# Patient Record
Sex: FEMALE | Race: WHITE | Employment: OTHER | URBAN - METROPOLITAN AREA
[De-identification: names, ages, dates, MRNs, and addresses within clinical notes are randomized per-mention and may not be internally consistent; named-entity substitution may affect disease eponyms.]

---

## 2018-10-24 PROBLEM — E78.5 HYPERLIPIDEMIA: Status: ACTIVE | Noted: 2018-10-24

## 2018-10-24 PROBLEM — L71.9 ROSACEA: Status: ACTIVE | Noted: 2018-10-24

## 2018-10-24 PROBLEM — R73.9 HYPERGLYCEMIA: Status: ACTIVE | Noted: 2018-10-24

## 2018-11-16 ENCOUNTER — HOSPITAL ENCOUNTER (OUTPATIENT)
Dept: CT IMAGING | Age: 68
Discharge: HOME | End: 2018-11-16
Attending: INTERNAL MEDICINE
Payer: SELF-PAY

## 2018-11-16 DIAGNOSIS — E78.5 HYPERLIPIDEMIA, UNSPECIFIED HYPERLIPIDEMIA TYPE: ICD-10-CM

## 2018-11-16 DIAGNOSIS — Z82.49 FH: CAD (CORONARY ARTERY DISEASE): ICD-10-CM

## 2018-11-16 PROCEDURE — 75571 CT HRT W/O DYE W/CA TEST: CPT

## 2018-11-26 NOTE — PROGRESS NOTES
Spoke with pt informed her that great news! Calcium score is 0. No detectable blockages in the coronary arteries.

## 2019-05-31 ENCOUNTER — HOSPITAL ENCOUNTER (OUTPATIENT)
Dept: DIABETES SERVICES | Age: 69
Discharge: HOME | End: 2019-05-31
Payer: MEDICARE

## 2019-05-31 DIAGNOSIS — E11.9 CONTROLLED TYPE 2 DIABETES MELLITUS WITHOUT COMPLICATION, WITHOUT LONG-TERM CURRENT USE OF INSULIN (HCC): ICD-10-CM

## 2019-05-31 PROCEDURE — G0108 DIAB MANAGE TRN  PER INDIV: HCPCS

## 2019-06-27 ENCOUNTER — HOSPITAL ENCOUNTER (OUTPATIENT)
Dept: MAMMOGRAPHY | Age: 69
Discharge: HOME | End: 2019-06-27
Attending: INTERNAL MEDICINE
Payer: MEDICARE

## 2019-06-27 DIAGNOSIS — Z78.0 POST-MENOPAUSAL: ICD-10-CM

## 2019-06-27 DIAGNOSIS — Z12.39 BREAST CANCER SCREENING: ICD-10-CM

## 2019-06-27 PROCEDURE — 77067 SCR MAMMO BI INCL CAD: CPT

## 2019-06-27 PROCEDURE — 77080 DXA BONE DENSITY AXIAL: CPT

## 2019-07-01 ENCOUNTER — HOSPITAL ENCOUNTER (OUTPATIENT)
Dept: DIABETES SERVICES | Age: 69
Discharge: HOME | End: 2019-07-01
Payer: MEDICARE

## 2019-07-01 PROCEDURE — G0109 DIAB MANAGE TRN IND/GROUP: HCPCS

## 2019-07-02 NOTE — PROGRESS NOTES
Spoke with pt informed her that her bone density still osteopenia but does not need to restart medication at this time.

## 2019-07-10 ENCOUNTER — HOSPITAL ENCOUNTER (OUTPATIENT)
Dept: LAB | Age: 69
Discharge: HOME | End: 2019-07-10

## 2019-07-10 PROCEDURE — 88305 TISSUE EXAM BY PATHOLOGIST: CPT

## 2019-07-22 ENCOUNTER — HOSPITAL ENCOUNTER (OUTPATIENT)
Dept: DIABETES SERVICES | Age: 69
Discharge: HOME | End: 2019-07-22
Payer: MEDICARE

## 2019-07-22 PROCEDURE — G0109 DIAB MANAGE TRN IND/GROUP: HCPCS

## 2019-07-22 NOTE — PROGRESS NOTES
Attended nutrition diabetes #2 group session today. Topics included: plate method for portion control; fiber and sodium guidelines; sugar substitutes; alcohol; eating out; recipe modification; label reading. Participant's nutrition goal:To lower A1C and prevent diabetes complications she will not skip meals and have a meal replacement and re-evaluate in 2 months. Participant's nutrition support plan: check food labels and use handouts given. Barriers identified by participant: Pt does not cook enough and needs to be better organized to have replacement bars available. Voiced/demonstrated understanding of material covered. Anticipated adherence is good. Plan for follow up is: will be sent a follow up questionnaire at 6 months and one year.

## 2019-08-06 ENCOUNTER — HOSPITAL ENCOUNTER (OUTPATIENT)
Dept: DIABETES SERVICES | Age: 69
Discharge: HOME | End: 2019-08-06
Payer: MEDICARE

## 2019-08-06 PROCEDURE — G0109 DIAB MANAGE TRN IND/GROUP: HCPCS

## 2019-08-06 NOTE — PROGRESS NOTES
Participant attended Diabetes #1 session today. Topics included: Characteristics/pathophysiology type 1/type 2 diabetes; Goal/acceptable blood glucose ranges/Hgb A1C/interpreting/using results;meters, continuous glucose monitors and insulin pumps. Using medications safely; Sick day management; Prevention/detection/treatment of acute complications.   - Verbalized understanding of material covered.  -Anticipated adherence is good   -Problems/barriers may be  none anticipated

## 2019-08-12 ENCOUNTER — HOSPITAL ENCOUNTER (OUTPATIENT)
Dept: DIABETES SERVICES | Age: 69
Discharge: HOME | End: 2019-08-12
Payer: MEDICARE

## 2019-08-12 PROCEDURE — G0109 DIAB MANAGE TRN IND/GROUP: HCPCS

## 2019-08-12 NOTE — PROGRESS NOTES
Participant attended Diabetes #2 session today. Topics included: Prevention/detection/treatment of chronic complications; sleep apnea; Developing strategies to promote health/change behavior/recommended screenings; Developing strategies to address psychosocial issues; Goal setting. Participants goal/support plan includes Diabetes Goal:  To exercise I will exercise/ walk daily for at least 5 days a week (walks at [facility]) for greater than 30 minutes starting 8-12-19 and increase as tolerated. Diabetes Plan:  I will keep track of exercise on a calendar. Problems/barriers may be:none anticipated; comments:   Plan for follow up/Recommendations: mail follow up survey at 6 months and one year.

## 2019-09-26 ENCOUNTER — HOSPITAL ENCOUNTER (OUTPATIENT)
Dept: DIABETES SERVICES | Age: 69
Discharge: HOME | End: 2019-09-26
Payer: MEDICARE

## 2019-09-26 PROCEDURE — G0109 DIAB MANAGE TRN IND/GROUP: HCPCS

## 2019-09-26 NOTE — PROGRESS NOTES
Attended diabetes follow up group class.  Open forum for clients to ask questions based on entire diabetes self management education program. Education topics are driven in this class based on clients' individual questions and needs. Topics included carbohydrates, proteins, fats, fiber, weight loss, meal planning, recipe modification, Hgb A1C, stress/depression, glucometer testing, continuous glucose monitoring, blood pressure, annual eye exam, foot care, signs/symptoms of high and low blood sugars and treatment.

## 2020-06-28 ENCOUNTER — HOSPITAL ENCOUNTER (OUTPATIENT)
Dept: MAMMOGRAPHY | Age: 70
Discharge: HOME | End: 2020-06-28
Attending: INTERNAL MEDICINE
Payer: MEDICARE

## 2020-06-28 DIAGNOSIS — Z12.31 VISIT FOR SCREENING MAMMOGRAM: ICD-10-CM

## 2020-06-28 PROCEDURE — 77067 SCR MAMMO BI INCL CAD: CPT

## 2021-06-09 ENCOUNTER — TRANSCRIBE ORDER (OUTPATIENT)
Dept: SCHEDULING | Age: 71
End: 2021-06-09

## 2021-06-09 DIAGNOSIS — Z12.31 VISIT FOR SCREENING MAMMOGRAM: Primary | ICD-10-CM

## 2021-07-23 ENCOUNTER — HOSPITAL ENCOUNTER (OUTPATIENT)
Dept: MAMMOGRAPHY | Age: 71
Discharge: HOME | End: 2021-07-23
Attending: INTERNAL MEDICINE
Payer: MEDICARE

## 2021-07-23 DIAGNOSIS — Z12.31 VISIT FOR SCREENING MAMMOGRAM: ICD-10-CM

## 2021-07-23 PROCEDURE — 77067 SCR MAMMO BI INCL CAD: CPT

## 2021-09-24 ENCOUNTER — HOSPITAL ENCOUNTER (OUTPATIENT)
Dept: PHYSICAL THERAPY | Age: 71
Discharge: HOME | End: 2021-09-24
Payer: MEDICARE

## 2021-09-24 PROCEDURE — 97161 PT EVAL LOW COMPLEX 20 MIN: CPT

## 2021-09-24 PROCEDURE — 97140 MANUAL THERAPY 1/> REGIONS: CPT

## 2021-09-24 NOTE — THERAPY EVALUATION
Ernesto Kingston  : 1950  [place] / [place] / Product Type: Medicare /  Santos Patel at [address] [facility] [address], [address], [place], [facility]  [phone]   [phone]         OUTPATIENT PHYSICAL THERAPY:Initial Assessment 2021    ICD-10: Treatment Diagnosis:   Difficulty in walking, not elsewhere classified (R26.2)  Pain in right ankle and joints of right foot (M25.571)  Stiffness of right ankle, not elsewhere classified (M25.671)    PRECAUTIONS/ALLERGIES:   Codeine and Mometasone furoate     FALL RISK SCORE: 1 (? 5 = High Risk)    MD ORDERS: Eval and Treat  MEDICAL/REFERRING DIAGNOSIS:  Pain of right heel (M79.671)    DATE OF ONSET: Beginning of Summer    REFERRING PHYSICIAN: Janice Ayers MD    RETURN PHYSICIAN APPOINTMENT: TBD by patient      Ambulatory/Rehab Services H2 Model Falls Risk Assessment    Risk Factors:       No Risk Factors Identified Ability to Rise from Chair:       (1)  Pushes up, successful in one attempt    Falls Prevention Plan:       No modifications necessary   Total: (5 or greater = High Risk): 1     [facility] [employer]. All Rights Reserved. [facility] Patent #[id number]. Federal Law prohibits the replication, distribution or use without written permission from [address] [place]:  Ms. Ernesto Kingston has attended 1 physical therapy session including initial evaluation as of 2021. Ernesto Kingston demonstrates decreased Rt ankle strength, decreased Rt ankle ROM, decreased tolerance of ADLs, decreased functional mobility, and decreased activity tolerance, all consistent with S/S of Rt dorsiflexion and eversion deficit from heel pain with weightbearing. Patient demonstrates tenderness mostly at Rt lateral heel during weightbearing and palpation. Slight edema surrounding Rt lateral malleoli with no tenderness to palpation.  Special tests negative ankle sprain, Fx, and achilles involvement at this time. According to their responses on the Foot and Ankle Ability Measure, Jim Mercedes is 38.1% limited by their ankle pain and dysfunction in their ability to participate in ADLs and their overall functional tolerance. Recommending skilled PT: manual therapeutic techniques (as appropriate), therapeutic exercises and activities, balance and comprehensive home exercises program to address current impairment. Jim Mercedes will benefit from skilled PT (medically necessary) to address above deficits affecting participation in basic ADLs and overall functional tolerance. PROBLEM LIST (Impacting functional limitations):  1. Decreased Strength  2. Decreased ADL/Functional Activities  3. Decreased Transfer Abilities  4. Decreased Ambulation Ability/Technique  5. Decreased Balance  6. Increased Pain  7. Decreased Activity Tolerance  8. Decreased [place] with Home Exercise Program INTERVENTIONS PLANNED:  1. Balance Exercise  2. Bed Mobility  3. Cold  4. Cryotherapy  5. Family Education  6. Gait Training  7. Heat  8. Home Exercise Program (HEP)  9. Manual Therapy  10. Neuromuscular Re-education/Strengthening  11. Range of Motion (ROM)  12. Therapeutic Activites  13. Therapeutic Exercise/Strengthening  14. Transfer Training  15. Ultrasound (US)  16. Aquatic therapy  17. Electrical Stimulation  18. Vasopneumatic compression   19. Dry Needling for Pain control   TREATMENT PLAN:  Effective Dates: 9/24/2021 TO 12/23/2021 (90 days). Frequency/Duration: 2 times a week for 90 Days    SHORT-TERM FUNCTIONAL GOALS: Time Frame: 4 weeks  1. Jim Mercedes will be compliant with home exercise program within 4 weeks in order to improve active participation with management of patient's symptoms and/or functional deficits. 2. Jim Mercedes will report <=3/10 pain with walking >15 minutes in order to participate in daily exercise and daily activities.    Kushal Miller will be able to stand >=15 minutes with <2/10 pain to feet in order to participate in household duties without issues/compromise. 4.  Nishant Huber  will improve Rt ankle dorsiflexion AROM from -3 to 0 (neutral) in order to show improvement in ankle ROM and tolerance for functional activity. [address] will be report improved score on the Foot and Ankle Ability Measure from 52/84 to 58/84 to indicate improvement in functional independence. DISCHARGE GOALS: Time Frame: 12 weeks  1. Nishant Huber will be independent with home exercise program within 8 weeks in order to improve independence with management of patient's symptoms and/or functional deficits. 2. Nishant Huber will report <=2/10 pain with participation in activities of daily living and overall functional mobility including walking and stair ambulation. [address]verria will be able to stand >=30 minutes without reports of increased Rt foot/ankle pain. [address] Echeverria will be report improved score on the Foot and Ankle Ability Measure from 58/84 to 64/84 to indicate improvement in functional independence. [address] Echeverria will improve ankle strength to >=4+/5 to improve tolerance of ADLs and improve overall functional mobility. REHABILITATION POTENTIAL FOR STATED GOALS: GOOD    Regarding Gisela Grande's therapy, I certify that the treatment plan above will be carried out by a therapist or under their direction. Thank you for this referral,  KADEN Sosa       Referring Physician Signature: Lupillo Junior MD              Date                    HISTORY:  All history obtained on 9/24/2021 unless otherwise noted. PATIENT STATED GOAL:   Patient would like to get back to walking without pain.      HISTORY OF PRESENT INJURY/ILLNESS (REASON FOR REFERRAL):   Patient reports she was doing her hair at the beginning of summer and stepped on the plug of a curling iron and went up onto the ball of her foot and it felt like stone bruise behind her big toe and heel area. Then her heel really started hurting about a month ago. She went to a football game in August and walked around a lot and it increased her pain and it has not calmed down since. Patient reports she was wearing sandals during that game and enjoys wearing chacos because of the high arch support. Patient reports when she lifts her big toe it causes the same pain on the lateral side of heel. Patient loves to walk and play with grandchildren and has not been able to do so in the last couple months. Patient reports pain increases throughout the day with increasing activity. Patient has night splint that she borrowed from friend and felt it was helping when she got up to urinate during the night because it relieves pressure. Does not walk around house barefoot due to pain. Pt reports ice helps reduce pain sometimes. Notes aleve also helps her pain. States she is having the most difficulty putting weight through her heel during any walking and standing, sit to stand. Pt states the pain can get as high as 9/10 and as low as 0/10 non weightbearing. PAST MEDICAL HISTORY/COMORBIDITIES:  Ms. Serena Reddy  has a past medical history of Hyperlipidemia (10/24/2018) and Rosacea (10/24/2018). Ms. Serena Reddy  has no past surgical history on file. Active Ambulatory Problems     Diagnosis Date Noted    Hyperlipidemia 10/24/2018    Rosacea 10/24/2018    Hyperglycemia 10/24/2018    ERRONEOUS ENCOUNTER--DISREGARD 09/29/2019     Resolved Ambulatory Problems     Diagnosis Date Noted    No Resolved Ambulatory Problems     No Additional Past Medical History       SOCIAL HISTORY/LIVING ENVIRONMENT:   Patient lives with  at home with stairs to enter home. Patient really enjoyed walking with grandchildren.     Social History     Socioeconomic History    Marital status:      Spouse name: Not on file    Number of children: Not on file    Years of education: Not on file    Highest education level: Not on file Occupational History    Not on file   Tobacco Use    Smoking status: Never Smoker    Smokeless tobacco: Never Used   Substance and Sexual Activity    Alcohol use: Yes     Comment: occasional    Drug use: Never    Sexual activity: Not on file   Other Topics Concern    Not on file   Social History Narrative    Not on file     Social Determinants of Health     Financial Resource Strain:     Difficulty of Paying Living Expenses:    Food Insecurity:     Worried About Running Out of Food in the Last Year:     [address] in the Last Year:    Transportation Needs:     Lack of Transportation (Medical):  Lack of Transportation (Non-Medical):    Physical Activity:     Days of Exercise per Week:     Minutes of Exercise per Session:    Stress:     Feeling of Stress :    Social Connections:     Frequency of Communication with Friends and Family:     Frequency of Social Gatherings with Friends and Family:     Attends Mandaen Services:     Active Member of Clubs or Organizations:     Attends Club or Organization Meetings:     Marital Status:    Intimate Partner Violence:     Fear of Current or Ex-Partner:     Emotionally Abused:     Physically Abused:     Sexually Abused:      PRIOR LEVEL OF FUNCTION/WOR/ACTIVITY:  Patient is retired but very active lifestyle through walking and playing with grandchildren. CURRENT MEDICATIONS:    Current Outpatient Medications:     metFORMIN (GLUCOPHAGE) 500 mg tablet, TAKE ONE TABLET BY MOUTH TWICE A DAY WITH MEAL(S), Disp: 180 Tab, Rfl: 3    simvastatin (ZOCOR) 40 mg tablet, Take 1 Tab by mouth nightly., Disp: 90 Tab, Rfl: 3    sulfacetamide sodium 9.8 % lotn, by Apply Externally route. 10% lotion, Disp: , Rfl:     ketoconazole (NIZORAL) 2 % shampoo, Apply  to affected area daily as needed for Itching., Disp: , Rfl:     furosemide (LASIX) 20 mg tablet, Take 1 Tab by mouth daily. , Disp: 30 Tab, Rfl: 1    Blood-Glucose Meter monitoring kit, Test daily as directed, Disp: 1 Kit, Rfl: 0    lancets misc, Test daily as directed, Disp: 1 Each, Rfl: 11    glucose blood VI test strips (ASCENSIA AUTODISC VI, ONE TOUCH ULTRA TEST VI) strip, Test daily as needed, Disp: 100 Strip, Rfl: 3     Date Last Reviewed:  9/24/2021   Number of Personal Factors/Comorbidities that affect the Plan of Care: 1-2: MODERATE COMPLEXITY   EXAMINATION:   OBSERVATION/ORTHOSTATIC POSTURAL ASSESSMENT: Assessed @ Initial Visit:    -Pt sits with forward head and rounded shoulders which indicate tight anterior chest musculature, upper trapezius, and levator scapula and weak posterior scapula musculature and deep cervical flexors. Pt displays decreased core motor control indicating weak core and low back musculature.  -Observed patients feet in static standing: increased rearfoot valgus on Lt, unable to adequetly assess due to pain in weightbearing and unable to bear full weight in Rt heel.       BALANCE (SLS) Date:  9/24/2021    Date: Date:   Right Unable to balance on RLE due to pain     Left WFL         MEASUREMENTS:          Date:  9/24/2021  Date:  Date:     Right Left Right Left Right Left   Ankle Circumference 25cm 24cm       Figure 8 49cm 49cm          AROM/PROM         Joint: Date:9/24/2021  Date:  Date:    Active LE ROM Right (Degrees) Left (Degrees) Right (Degrees) Left (Degrees) Right (Degrees) Left (Degrees)   Hip Flexion         Hip Extension         Hip IR         Hip ER         Knee Extension         Knee Flexion         Ankle DF -3 0       Ankle PF 64 58       Ankle IV 35 40       Ankle EV 18 35         STRENGTH         Joint: Date: 9/24/2021  Date:  Date:     Right Left Right Left Right Left   Hip Abduction         Hip Adduction         Hip IR         Hip ER         Hip Flexion 5/5 5/5       Knee Extension 5/5 5/5       Knee Flexion 5/5 5/5       Ankle DF 5/5 5/5  Pain base of anterior and lateral MTP       Ankle PF 5/5 5/5       Ankle IV 5/5 5/5       Ankle EV 5/5 5/5 SPECIAL TESTS: Assessed @ Initial Visit:    -Talar tilt: Negative B, restricted movement in Rt ankle   -Anterior drawer: Negative B   -Ankle impingement: Negative B   -Squeeze test: Negative B   -Calf squeeze: Negative B    PASSIVE ACCESSORY MOTION:   -Talocrural mobility: Hypomobile on Rt side   -Subtalar mobility: Hypomobile, decreased mobility at joint B   - Midtarsal mobility: WFL   -Tarsometatarsal mobility: WFL   -metatarsophalangeal(MTP) mobility: decreased mobility on Rt first metatarsal      PALPATION Date: 9/24/2021   TTP -Lateral aspect of Rt calcaneous  - Tenderness on Rt piriformis and glute med  -Tenderness on Rt hamstring   TONE WFL   Slight atrophy on Rt gastroc compared to Lt. NEUROLOGICAL SCREEN: Assessed @ Initial Visit    -RADIATING SYMPTOMS: NO     -DERMATOMES:Normal and equal B      FUNCTIONAL MOBILITY:  Assessed @ Initial Visit:    -Affecting participation in basic ADLs and functional tasks.   -Limited tolerance of walking and standing   -Ambulation/Gait: Patient ambulates with antalgic gait with decreased stance time on RLE. -Bed mobility: WFL   -Stairs: difficulty due to pain   -Transfers: WFL   -Wheelchair: N/A     Body Structures Involved:  1. Bones  2. Joints  3. Muscles  4. Ligaments Body Functions Affected:  1. Sensory/Pain  2. Neuromusculoskeletal  3. Movement Related Activities and Participation Affected:  1. Mobility  2. Self Care  3. Domestic Life  4. Interpersonal Interactions and Relationships  5. Community, Social and [place]   Number of elements that affect the Plan of Care: 4+: HIGH COMPLEXITY   CLINICAL PRESENTATION:   Presentation: Evolving clinical presentation with changing clinical characteristics: MODERATE COMPLEXITY   CLINICAL DECISION MAKING:   TOOL USED:   -FOOT AND ANKLE ABILITY MEASURE (FAAM)  Score:  Initial: 52/84 Most Recent: X (Date: 9/24/2021)   Interpretation of Score:  For the \"Activities of Daily Living\", there are 21 questions each scored on a 5 point scale with 0 representing \"Unable to do\" and 4 representing \"No difficulty\". The lower the score, the greater the functional disability. 84/84 represents no disability. Minimal detectable change is 5.7 points. With the addition of the 8 questions in the \"Sports Subscale,\" there are 29 questions, each scored on a 5 point scale with 0 representing \"Unable to do\" and 4 representing \"No difficulty\". The lower the score, the greater the functional disability. 116/116 represents no disability. Minimal detectable change is 12.3 points. MEDICAL NECESSITY:  · Skilled intervention continues to be required due to above deficits affecting participation in basic ADLs and overall functional tolerance. REASON FOR SERVICES/ OTHER COMMENTS:  · Patient continues to require skilled intervention due to  above deficits affecting participation in basic ADLs and overall functional tolerance.      Use of outcome tool(s) and clinical judgement create a POC that gives a: Questionable prediction of patient's progress: MODERATE COMPLEXITY        TOTAL TREATMENT DURATION:  PT Patient Time In/Time Out  Time In: 0930  Time Out: [address], SPT

## 2021-09-24 NOTE — PROGRESS NOTES
Babs Granados  : 1950  [place]  [place] [address] [place] at [address] [place] [address]  [phone]   [phone]      OUTPATIENT PHYSICAL THERAPY: Daily Treatment Note 2021  Visit Count:  1          TREATMENT PLAN:  Effective Dates: 2021 TO 2021 (90 days). Frequency/Duration: 2 times a week for 90 Days    ICD-10: Treatment Diagnosis:   Difficulty in walking, not elsewhere classified (R26.2)  Pain in right ankle and joints of right foot (M25.571)  Stiffness of right ankle, not elsewhere classified (M25.671) Future Appointments   Date Time Provider Brad Izaguirre   2021  9:30 AM Danii Julian, PT, DPT [facility]   2021  9:30 AM Danii Julian, PT, DPT [facility]   10/5/2021  9:30 AM Danii Julian, PT, DPT [facility]   10/8/2021  9:30 AM Danii Julian, PT, DPT [facility]   10/12/2021  9:30 AM Danii Julian, PT, DPT [facility]   10/15/2021  9:30 AM Danii Julian, PT, DPT [facility]   10/19/2021  9:30 AM Danii Julian, PT, DPT [facility]   10/21/2021  9:30 AM Danii Julian, PT, DPT [facility]   10/26/2021  9:30 AM Danii Julian, PT, DPT [facility]   10/29/2021  9:30 AM Danii Julian, PT, DPT [facility]          PRE-TREATMENT SYMPTOMS/COMPLAINTS:  See eval    MEDICATIONS REVIEWED:  2021   TREATMENT:   (In addition to Assessment/Re-Assessment sessions the following treatments were rendered)    THERAPEUTIC EXERCISE: (0 minutes):  Exercises per grid below to improve mobility, strength and balance. Required minimal visual and verbal cues to promote proper body alignment and promote proper body posture. Progressed resistance, range and complexity of movement as indicated.      Date:  2021 Date:   Date:     Activity/Exercise Parameters Parameters Parameters HEP:  [employer] Portal  Access Code: [email or web address]  URL: [email or web address]  Date: 09/24/2021  Prepared by: Silvana Weaver    Exercises  Towel Scrunches - 2 x daily - 7 x weekly - 2 sets - 10 reps  Seated Figure 4 Piriformis Stretch - 2 x daily - 7 x weekly - 2 sets - 10 reps  Seated Heel Toe Raises - 2 x daily - 7 x weekly - 2 sets - 10 reps      MANUAL THERAPY: (23 minutes): Joint mobilization, Soft tissue mobilization and Manipulation was utilized and necessary because of the patient's restricted joint motion, painful spasm, loss of articular motion and restricted motion of soft tissue. Patient prone:  -STM with Gua sha tool on plantar aspect of Rt foot, achilles, plantar fascia, heel cup  -KT tape to right heel and achilles to improve proprioception of Rt foot and aide with weightbearing. Patient supine:  -traction for Rt great toe FL and Ex  -First ray mobilization    (Used abbreviations: MET - muscle energy technique; PNF - proprioceptive neuromuscular facilitation; NMR - neuromuscular re-education; AP - anterior to posterior; PA - posterior to anterior)    MODALITIES: (0 minutes):      None today       TREATMENT/SESSION ASSESSMENT:  Jerson Meza verbalized understanding of role of PT and POC. Patient tolerated treatment well with no increase in pain post session. Patient educated on proper foot wear and encouraged to wear tennis shoes instead of sandals. Patient educated on KT and how to remove it if skin gets irritated. Increased movement with decreased pain during Rt great toe traction and mobilization. RECOMMENDATIONS/INTENT FOR NEXT TREATMENT SESSION: \"Next visit will focus on advancements to more challenging activities\".     PAIN: Initial: 0/10 Post Session:  0/10       Total Treatment Billable Duration: 23 minutes     Adam Jorge, PT, DPT

## 2021-09-28 ENCOUNTER — HOSPITAL ENCOUNTER (OUTPATIENT)
Dept: PHYSICAL THERAPY | Age: 71
Discharge: HOME | End: 2021-09-28
Payer: MEDICARE

## 2021-09-28 PROCEDURE — 97140 MANUAL THERAPY 1/> REGIONS: CPT

## 2021-09-28 PROCEDURE — 97110 THERAPEUTIC EXERCISES: CPT

## 2021-09-28 NOTE — PROGRESS NOTES
Milka Bowie  : 1950  [place]  [place]: [place] [address] at [place] [address], [address] [place] [address]  [phone]   [phone]      OUTPATIENT PHYSICAL THERAPY: Daily Treatment Note 2021  Visit Count:  2          TREATMENT PLAN:  Effective Dates: 2021 TO 2021 (90 days). Frequency/Duration: 2 times a week for 90 Days    ICD-10: Treatment Diagnosis:   Difficulty in walking, not elsewhere classified (R26.2)  Pain in right ankle and joints of right foot (M25.571)  Stiffness of right ankle, not elsewhere classified (M25.671) Future Appointments   Date Time Provider Brad Izaguirre   2021  9:30 AM Sonda Manger, PT, DPT [facility]   2021  9:30 AM Sonda Manger, PT, DPT [facility]   10/5/2021  9:30 AM Sonda Manger, PT, DPT [facility]   10/8/2021  9:30 AM Sonda Manger, PT, DPT [facility]   10/12/2021  9:30 AM Sonda Manger, PT, DPT [facility]   10/15/2021  9:30 AM Sonda Manger, PT, DPT [facility]   10/19/2021  9:30 AM Sonda Manger, PT, DPT [facility]   10/21/2021  9:30 AM Sonda Manger, PT, DPT [facility]   10/26/2021  9:30 AM Sonda Manger, PT, DPT [facility]   10/29/2021  9:30 AM Sonda Manger, PT, DPT [facility]          PRE-TREATMENT SYMPTOMS/COMPLAINTS:  Patient reports last session helped decrease pain but was very active over the weekend so pain increased after Saturday. Patient reported KT tape helped a lot last time and it lasted 4 days. Patient reported continuous pain when weightbearing. Patient reported wearing sandals today because it was faster and she was rushing out the door but reported she knows she is supposed to be wearing tennis shoes. Patient reported she was going to [employer] to get scanned to look for the best insoles/shoe for her foot.      MEDICATIONS REVIEWED: 9/28/2021   TREATMENT:   (In addition to Assessment/Re-Assessment sessions the following treatments were rendered)    THERAPEUTIC EXERCISE: (15 minutes):  Exercises per grid below to improve mobility, strength and balance. Required minimal visual and verbal cues to promote proper body alignment and promote proper body posture. Progressed resistance, range and complexity of movement as indicated. Date:  9/24/2021 Date:  9/28/2021 Date:     Activity/Exercise Parameters Parameters Parameters   Towel scrunches  1x10 scrunches    Alternating toe extension  2x10 great toe raises    Heel toe raises standing  1x10  painful    HEP review  5min          Heel raises off step with heel off step  3x10            HEP:  [employer]  Access Code: [email or web address]  URL: [email or web address]  Date: 09/24/2021  Prepared by: Maik Ramirez    Exercises  Towel Scrunches - 2 x daily - 7 x weekly - 2 sets - 10 reps  Seated Figure 4 Piriformis Stretch - 2 x daily - 7 x weekly - 2 sets - 10 reps  Seated Heel Toe Raises - 2 x daily - 7 x weekly - 2 sets - 10 reps      MANUAL THERAPY: (38 minutes): Joint mobilization, Soft tissue mobilization and Manipulation was utilized and necessary because of the patient's restricted joint motion, painful spasm, loss of articular motion and restricted motion of soft tissue. Patient prone:  -STM with Gua sha tool on plantar aspect of Rt foot, achilles, plantar fascia, heel cup  -STM with hands on plantar aspect of Rt foot, achilles, plantar fascia, heel cup  -KT tape to right heel, MTP joint and great toe, and achilles to improve proprioception of Rt foot and aide with weightbearing.      Patient supine:  -traction for Rt great toe FL and Ex  -Talocrural mobilization to increase DF  -Subtalar mobilization to increase IV/EV      (Used abbreviations: MET - muscle energy technique; PNF - proprioceptive neuromuscular facilitation; NMR - neuromuscular re-education; AP - anterior to posterior; PA - posterior to anterior)    MODALITIES: (0 minutes):      None today       TREATMENT/SESSION ASSESSMENT:  Aime Kennedy tolerated treatment session well with decrease in pain. Patient noted she wanted to continue taping until pain has subsided. Continued tenderness along lateral aspect of heel. Patient still demonstrated ambulating on ball of foot due to avoidance of causing pain on lateral heel. Unable to perform toe raise without cushioning on her heel due to pain in heel. Continued limitations with subtalar joint and MTP going into extension with subsequent pain. Possible sesmoiditis due to point tenderness and given her prolonged toe walking to relieve pressure from heel. RECOMMENDATIONS/INTENT FOR NEXT TREATMENT SESSION: \"Next visit will focus on advancements to more challenging activities\".     PAIN: Initial: 4/10 Post Session:  2/10       Total Treatment Billable Duration: 53 minutes  PT Patient Time In/Time Out  Time In: 0930  Time Out: KADEN Bailey

## 2021-09-30 ENCOUNTER — HOSPITAL ENCOUNTER (OUTPATIENT)
Dept: PHYSICAL THERAPY | Age: 71
Discharge: HOME | End: 2021-09-30
Payer: MEDICARE

## 2021-09-30 PROCEDURE — 97140 MANUAL THERAPY 1/> REGIONS: CPT

## 2021-09-30 PROCEDURE — 97110 THERAPEUTIC EXERCISES: CPT

## 2021-09-30 NOTE — PROGRESS NOTES
Karla Laguna  : 1950  [place]  [place]: [place] [address] at [place] [address], [address] [facility]  [phone]   [phone]      OUTPATIENT PHYSICAL THERAPY: Daily Treatment Note 2021  Visit Count:  3          TREATMENT PLAN:  Effective Dates: 2021 TO 2021 (90 days). Frequency/Duration: 2 times a week for 90 Days    ICD-10: Treatment Diagnosis:   Difficulty in walking, not elsewhere classified (R26.2)  Pain in right ankle and joints of right foot (M25.571)  Stiffness of right ankle, not elsewhere classified (M25.671) Future Appointments   Date Time Provider Brad Izaguirre   2021  9:30 AM Satira Pore, PT, DPT [facility]   10/5/2021  9:30 AM Satira Pore, PT, DPT [facility]   10/8/2021  9:30 AM Satira Pore, PT, DPT [facility]   10/12/2021  9:30 AM Satira Pore, PT, DPT [facility]   10/15/2021  9:30 AM Satira Pore, PT, DPT [facility]   10/19/2021  9:30 AM Satira Pore, PT, DPT [facility]   10/21/2021  9:30 AM Satira Pore, PT, DPT [facility]   10/26/2021  9:30 AM Satira Pore, PT, DPT [facility]   10/29/2021  9:30 AM Satira Pore, PT, DPT [facility]          PRE-TREATMENT SYMPTOMS/COMPLAINTS:  Patient reports no change in pain in heel since last visit. Patient reports increase in pain in great toe around MCP joint potentially due to taping. Patient stated she went to [employer] and bought a pair of HOKA shoes and she said she thinks they help but have not worn them long enough to know. Patient said taping the heel seemed to help but still is altering her gait pattern to stay off of heel due to pain.      MEDICATIONS REVIEWED:  2021   TREATMENT:   (In addition to Assessment/Re-Assessment sessions the following treatments were rendered)    THERAPEUTIC EXERCISE: (23 minutes):  Exercises per grid below to improve mobility, strength and balance. Required minimal visual and verbal cues to promote proper body alignment and promote proper body posture. Progressed resistance, range and complexity of movement as indicated. Date:   Date:  9/30/2021   Activity/Exercise Parameters Parameters   Towel scrunches 1x10 scrunches    Alternating toe extension 2x10 great toe raises    Half kneeling forward leans for DF  1x10 reps  Manual pressure to talus to promote DF   Heel toe raises standing 1x10  painful 2x10   HEP review 5min    Lateral step up     Heel raises off step with heel off step 3x10 3x10    Balance and reach forward     Squat heel raises     SL squat heel raises     Hip abduction     Bike  5min   Pharmaxis board  5min     HEP:  [email or web address]  Access Code: [email or web address]  URL: [email or web address]  Date: 09/24/2021  Prepared by: Ken Schneider    Exercises  Towel Scrunches - 2 x daily - 7 x weekly - 2 sets - 10 reps  Seated Figure 4 Piriformis Stretch - 2 x daily - 7 x weekly - 2 sets - 10 reps  Seated Heel Toe Raises - 2 x daily - 7 x weekly - 2 sets - 10 reps  Access Code: [email or web address]  URL: [email or web address]  Date: 09/30/2021  Prepared by: Ken Lyndhurst    Exercises  Long Sitting Ankle Inversion with Resistance - 2 x daily - 7 x weekly - 2 sets - 10 reps  Long Sitting Ankle Plantar Flexion with Resistance - 2 x daily - 7 x weekly - 2 sets - 10 reps  Long Sitting Ankle Eversion with Resistance - 2 x daily - 7 x weekly - 2 sets - 10 reps  Long Sitting Ankle Dorsiflexion with Anchored Resistance - 2 x daily - 7 x weekly - 2 sets - 10 reps  Eccentric Heel Lowering on Step - 2 x daily - 7 x weekly - 2 sets - 10 reps  Standing Heel Raise - 2 x daily - 7 x weekly - 2 sets - 10 reps  Heel Toe Raises with Counter Support - 2 x daily - 7 x weekly - 2 sets - 10 reps      MANUAL THERAPY: (35 minutes): Joint mobilization, Soft tissue mobilization and Manipulation was utilized and necessary because of the patient's restricted joint motion, painful spasm, loss of articular motion and restricted motion of soft tissue. Patient prone:  -STM with Gua sha tool on plantar aspect of Rt foot, achilles, plantar fascia, heel cup  -STM with hands on plantar aspect of Rt foot, achilles, plantar fascia, heel cup  -KT tape to right heel and achilles to improve proprioception of Rt foot and aide with weightbearing. Patient supine:  -traction for Rt great toe FL and Ex  -Talocrural mobilization to increase DF  -Subtalar mobilization to increase IV/EV      (Used abbreviations: MET - muscle energy technique; PNF - proprioceptive neuromuscular facilitation; NMR - neuromuscular re-education; AP - anterior to posterior; PA - posterior to anterior)    MODALITIES: (0 minutes):      None today       TREATMENT/SESSION ASSESSMENT:  Estephania Neely tolerated treatment session well with decrease in pain. Patient reported she is going to go to a football game this weekend and will be walking more. Patient advised to wear new tennis shoes with inserts and report how the tape on achilles feels. Patient still demonstrates altered and antalgic gait even with new shoes. RECOMMENDATIONS/INTENT FOR NEXT TREATMENT SESSION: \"Next visit will focus on advancements to more challenging activities\".     PAIN: Initial: 6-7/10 Post Session:  3/10     Total Treatment Billable Duration: 58 minutes  PT Patient Time In/Time Out  Time In: 0930  Time Out: KADEN Bailey

## 2021-10-05 ENCOUNTER — HOSPITAL ENCOUNTER (OUTPATIENT)
Dept: PHYSICAL THERAPY | Age: 71
Discharge: HOME | End: 2021-10-05
Payer: MEDICARE

## 2021-10-05 PROCEDURE — 97140 MANUAL THERAPY 1/> REGIONS: CPT

## 2021-10-05 PROCEDURE — 97110 THERAPEUTIC EXERCISES: CPT

## 2021-10-05 NOTE — PROGRESS NOTES
Kitty Emmanuel  : 1950  [place]  [place]: [place] [address] at [address]  [phone]   [phone]      OUTPATIENT PHYSICAL THERAPY: Daily Treatment Note 10/5/2021  Visit Count:  4          TREATMENT PLAN:  Effective Dates: 2021 TO 2021 (90 days). Frequency/Duration: 2 times a week for 90 Days    ICD-10: Treatment Diagnosis:   Difficulty in walking, not elsewhere classified (R26.2)  Pain in right ankle and joints of right foot (M25.571)  Stiffness of right ankle, not elsewhere classified (M25.671) Future Appointments   Date Time Provider Brad Izaguirre   10/8/2021  9:30 AM Kathyrn West, PT, DPT [facility]   10/12/2021  9:30 AM Kathyrn West, PT, DPT [facility]   10/15/2021  9:30 AM Kathyrn West, PT, DPT [facility]   10/19/2021  9:30 AM Kathyrn West, PT, DPT [facility]   10/21/2021  9:30 AM Kathyrn West, PT, DPT [facility]   10/26/2021  9:30 AM Kathyrn West, PT, DPT [facility]   10/29/2021  9:30 AM Kathyrn West, PT, DPT [facility]          PRE-TREATMENT SYMPTOMS/COMPLAINTS:  Patient reports no change in pain in heel since last visit. Patient does feel taping is helping but still unable to fully weight bear on lateral aspect of Rt heel. Patient reports her new shoes are helping but still unable to put full weight through heel. Patient is having difficulty walking up heel and has increased pain from doing so yesterday. MEDICATIONS REVIEWED:  10/5/2021   TREATMENT:   (In addition to Assessment/Re-Assessment sessions the following treatments were rendered)    THERAPEUTIC EXERCISE: (30 minutes):  Exercises per grid below to improve mobility, strength and balance. Required minimal visual and verbal cues to promote proper body alignment and promote proper body posture.   Progressed resistance, range and complexity of movement as indicated. Date:   Date:  9/30/2021 Date:  10/5/2021   Activity/Exercise Parameters Parameters    Scifit bike   5min  Level 3.5   Towel scrunches 1x10 scrunches  2x10   Alternating toe extension 2x10 great toe raises  2x10 reps B   Half kneeling forward leans for DF  1x10 reps  Manual pressure to talus to promote DF    Heel toe raises standing 1x10  painful 2x10    HEP review 5min     Lateral step up      Heel raises off step with heel off step 3x10 3x10     Balance and reach forward      Squat heel raises      SL squat heel raises      Hip abduction      Bike  5min    Rocker board  5min    Resisted ankle DF/PF/IV/EV   1x10 reps   Hold 8s  Rt only  Manual resistance   Alternating toe raises   2x10 B   Balance on Airex    [id number]  3x8 B rotation with blue medicine ball   Michelle step overs   3x10 Rt only   Calf stretch   [id number]           HEP:  [employer]  Access Code: [email or web address]  URL: [email or web address]  Date: 09/24/2021  Prepared by: Carlton Nguyen    Exercises  Towel Scrunches - 2 x daily - 7 x weekly - 2 sets - 10 reps  Seated Figure 4 Piriformis Stretch - 2 x daily - 7 x weekly - 2 sets - 10 reps  Seated Heel Toe Raises - 2 x daily - 7 x weekly - 2 sets - 10 reps  Access Code: [email or web address]  URL: [email or web address]  Date: 09/30/2021  Prepared by: Carlton Nguyen    Exercises  Long Sitting Ankle Inversion with Resistance - 2 x daily - 7 x weekly - 2 sets - 10 reps  Long Sitting Ankle Plantar Flexion with Resistance - 2 x daily - 7 x weekly - 2 sets - 10 reps  Long Sitting Ankle Eversion with Resistance - 2 x daily - 7 x weekly - 2 sets - 10 reps  Long Sitting Ankle Dorsiflexion with Anchored Resistance - 2 x daily - 7 x weekly - 2 sets - 10 reps  Eccentric Heel Lowering on Step - 2 x daily - 7 x weekly - 2 sets - 10 reps  Standing Heel Raise - 2 x daily - 7 x weekly - 2 sets - 10 reps  Heel Toe Raises with Counter Support - 2 x daily - 7 x weekly - 2 sets - 10 reps      MANUAL THERAPY: (25 minutes): Joint mobilization, Soft tissue mobilization and Manipulation was utilized and necessary because of the patient's restricted joint motion, painful spasm, loss of articular motion and restricted motion of soft tissue. Patient prone:  -STM with Gua sha tool on plantar aspect of Rt foot, achilles, plantar fascia, heel cup  -KT tape to right heel and achilles to improve proprioception of Rt foot and aide with weightbearing. Patient supine:  -traction for Rt great toe FL and Ex  -STM with hands on plantar aspect of Rt foot, achilles, plantar fascia, heel cup  -Talocrural mobilization to increase DF with dysem   -Subtalar mobilization to increase IV/EV with dysem  -fat pad mobility with dysem       (Used abbreviations: MET - muscle energy technique; PNF - proprioceptive neuromuscular facilitation; NMR - neuromuscular re-education; AP - anterior to posterior; PA - posterior to anterior)    MODALITIES: (0 minutes):      None today       TREATMENT/SESSION ASSESSMENT:  Adama Contreras tolerated treatment session well with decrease in pain. Continues to have stiffness to Rt subtalar joint but responds well to manual mobility. Tenderness during fat pad mobilization with dysem. Good strength during manual resisted exercises with complaints of discomfort over bunion and medial Rt knee pain. Educated knee pain has presented itself as a result of prolonged altered gait pattern. Cues to distribute weight evenly during balance exercise as well as post treatment when walking out. Tends to transfer more weight laterally on Rt foot to avoid pain at base of Rt great toe however causes heel pain while doing so. RECOMMENDATIONS/INTENT FOR NEXT TREATMENT SESSION: \"Next visit will focus on advancements to more challenging activities\".     PAIN: Initial: 6-7/10 Post Session:  3/10     Total Treatment Billable Duration: 55 minutes  PT Patient Time In/Time Out  Time In: 0930  Time Out: [place], [place]

## 2021-10-08 ENCOUNTER — HOSPITAL ENCOUNTER (OUTPATIENT)
Dept: PHYSICAL THERAPY | Age: 71
Discharge: HOME | End: 2021-10-08
Payer: MEDICARE

## 2021-10-08 PROCEDURE — 97140 MANUAL THERAPY 1/> REGIONS: CPT

## 2021-10-08 PROCEDURE — 97110 THERAPEUTIC EXERCISES: CPT

## 2021-10-08 NOTE — PROGRESS NOTES
Amita Parks  : 1950  [place]  Secondary: [address]  [phone]   [phone]      OUTPATIENT PHYSICAL THERAPY: Daily Treatment Note 10/8/2021  Visit Count:  5          TREATMENT PLAN:  Effective Dates: 2021 TO 2021 (90 days). Frequency/Duration: 2 times a week for 90 Days    ICD-10: Treatment Diagnosis:   Difficulty in walking, not elsewhere classified (R26.2)  Pain in right ankle and joints of right foot (M25.571)  Stiffness of right ankle, not elsewhere classified (M25.671) Future Appointments   Date Time Provider Brad Izaguirre   10/8/2021  9:30 AM Sharrie Snipe, PT, DPT [facility]   10/12/2021  9:30 AM Sharrie Snipe, PT, DPT [facility]   10/15/2021  9:30 AM Sharrie Snipe, PT, DPT [facility]   10/19/2021  9:30 AM Sharrie Snipe, PT, DPT [facility]   10/21/2021  9:30 AM Sharrie Snipe, PT, DPT [facility]   10/26/2021  9:30 AM Sharrie Snipe, PT, DPT [facility]   10/29/2021  9:30 AM Sharrie Snipe, PT, DPT [facility]          PRE-TREATMENT SYMPTOMS/COMPLAINTS:  Patient reports some decrease in pain in Rt heel but not much. Patient reported she walked 4 blocks in [address] [place] [address] on Monday and had so much pain that she had to lay on the sofa with her feet up for the rest of the day. Patient reported as long as she has good cushion she can attempt to walk \"normally\" but if she is barefoot she can not put pressure on her heel. MEDICATIONS REVIEWED:  10/8/2021   TREATMENT:   (In addition to Assessment/Re-Assessment sessions the following treatments were rendered)    THERAPEUTIC EXERCISE: (30 minutes):  Exercises per grid below to improve mobility, strength and balance. Required minimal visual and verbal cues to promote proper body alignment and promote proper body posture.   Progressed resistance, range and complexity of movement as indicated. Date:  9/30/2021 Date:  10/5/2021 Date:  10/8/2021      Activity/Exercise Parameters Parameters Parameters    Scifit bike  5min  Level 3.5 5min   Level 4    Towel scrunches  2x10 2x10    Alternating toe extension  2x10 reps B 2x10 reps B    Half kneeling forward leans for DF 1x10 reps  Manual pressure to talus to promote DF      Heel toe raises standing 2x10      HEP review       Lateral step up       Heel raises off step with heel off step 3x10   3x10    Balance and reach forward       Squat heel raises   1x10    SL squat heel raises       Hip abduction       Bike 5min      Rocker board 5min      Resisted ankle DF/PF/IV/EV  1x10 reps   Hold 8s  Rt only  Manual resistance     Alternating toe raises  2x10 B     Balance on Airex   [id number]  3x8 B rotation with blue medicine ball     Michelle step overs       Calf stretch   3x1min    Weight shifts on airex   3x10 B    SLS on airex   6x10s B    Balance on step/airex   3x10B     Lateral walking   8x15' with red band on feet  Shoes on                    HEP:  [employer]  Access Code: [email or web address]  URL: [email or web address]  Date: 09/24/2021  Prepared by: Suzi Colace    Exercises  Towel Scrunches - 2 x daily - 7 x weekly - 2 sets - 10 reps  Seated Figure 4 Piriformis Stretch - 2 x daily - 7 x weekly - 2 sets - 10 reps  Seated Heel Toe Raises - 2 x daily - 7 x weekly - 2 sets - 10 reps  Access Code: [email or web address]  URL: [email or web address]  Date: 09/30/2021  Prepared by: Suzi Colace    Exercises  Long Sitting Ankle Inversion with Resistance - 2 x daily - 7 x weekly - 2 sets - 10 reps  Long Sitting Ankle Plantar Flexion with Resistance - 2 x daily - 7 x weekly - 2 sets - 10 reps  Long Sitting Ankle Eversion with Resistance - 2 x daily - 7 x weekly - 2 sets - 10 reps  Long Sitting Ankle Dorsiflexion with Anchored Resistance - 2 x daily - 7 x weekly - 2 sets - 10 reps  Eccentric Heel Lowering on Step - 2 x daily - 7 x weekly - 2 sets - 10 reps  Standing Heel Raise - 2 x daily - 7 x weekly - 2 sets - 10 reps  Heel Toe Raises with Counter Support - 2 x daily - 7 x weekly - 2 sets - 10 reps      MANUAL THERAPY: (25 minutes): Joint mobilization, Soft tissue mobilization and Manipulation was utilized and necessary because of the patient's restricted joint motion, painful spasm, loss of articular motion and restricted motion of soft tissue. Patient prone:  -STM with Gua sha tool on plantar aspect of Rt foot, achilles, plantar fascia, heel cup  -KT tape to right heel and achilles to improve proprioception of Rt foot and aide with weightbearing. Patient supine:  -traction for Rt great toe FL and Ex  -STM with hands on plantar aspect of Rt foot, achilles, plantar fascia, heel cup  -Talocrural mobilization to increase DF with dysem   -Subtalar mobilization to increase IV/EV with dysem  -fat pad mobility with dysem       (Used abbreviations: MET - muscle energy technique; PNF - proprioceptive neuromuscular facilitation; NMR - neuromuscular re-education; AP - anterior to posterior; PA - posterior to anterior)    MODALITIES: (0 minutes):      None today       TREATMENT/SESSION ASSESSMENT:  Lesa Gannon tolerated treatment session well with decrease in pain. Patient demonstrated improved heel strike post session with less antalgic gait. Patient demonstrated weakness in B hips when performing SLS and had difficulty with lateral walking with the red band around her feet. Patient reported increased fatigue after lateral walking and wanted to take a break. Patient demonstrated decreased tenderness when using gua sha tool. RECOMMENDATIONS/INTENT FOR NEXT TREATMENT SESSION: \"Next visit will focus on advancements to more challenging activities\".     PAIN: Initial: 6/10 Post Session:  4/10     Total Treatment Billable Duration: 55 minutes  PT Patient Time In/Time Out  Time In: 0930  Time Out: Lynn SPT

## 2021-10-12 ENCOUNTER — HOSPITAL ENCOUNTER (OUTPATIENT)
Dept: PHYSICAL THERAPY | Age: 71
Discharge: HOME | End: 2021-10-12
Payer: MEDICARE

## 2021-10-12 PROCEDURE — 97140 MANUAL THERAPY 1/> REGIONS: CPT

## 2021-10-12 PROCEDURE — 97110 THERAPEUTIC EXERCISES: CPT

## 2021-10-12 NOTE — PROGRESS NOTES
Kenneth Leyden  : 1950  [place]  Secondary: [facility] at [place] [address], [address] [facility]  [phone]   [phone]      OUTPATIENT PHYSICAL THERAPY: Daily Treatment Note 10/12/2021  Visit Count:  6          TREATMENT PLAN:  Effective Dates: 2021 TO 2021 (90 days). Frequency/Duration: 2 times a week for 90 Days    ICD-10: Treatment Diagnosis:   Difficulty in walking, not elsewhere classified (R26.2)  Pain in right ankle and joints of right foot (M25.571)  Stiffness of right ankle, not elsewhere classified (M25.671) Future Appointments   Date Time Provider Brad Izaguirre   10/12/2021  9:30 AM Samella Sours, PT, DPT [facility]   10/15/2021  9:30 AM Samella Sours, PT, DPT [facility]   10/19/2021  9:30 AM Samella Sours, PT, DPT [facility]   10/21/2021  9:30 AM Samella Sours, PT, DPT [facility]   10/26/2021  9:30 AM Samella Sours, PT, DPT [facility]   10/29/2021  9:30 AM Samella Sours, PT, DPT [facility]          PRE-TREATMENT SYMPTOMS/COMPLAINTS:  Patient reports increased pain yesterday from increased walking distance and reported the pain in on her heel was \"excruciating\" and preventing her from having a normal walking pattern. Patient reported she only wears her HOKA shoes even around her house and the pain did not go down. Patient reports she wore a night splint last night and it helped decrease her pain but her pain is still higher than normal today. MEDICATIONS REVIEWED:  10/12/2021   TREATMENT:   (In addition to Assessment/Re-Assessment sessions the following treatments were rendered)    THERAPEUTIC EXERCISE: (38 minutes):  Exercises per grid below to improve mobility, strength and balance. Required minimal visual and verbal cues to promote proper body alignment and promote proper body posture.   Progressed resistance, range and complexity of movement as indicated. Date:  9/30/2021 Date:  10/5/2021 Date:  10/8/2021   Date:  10/12/2021     Activity/Exercise Parameters Parameters Parameters Parameters   Scifit bike  5min  Level 3.5 5min   Level 4 5min   Level 4.5 then had to decrease it to 3.5 at 2min due to pain   Towel scrunches  2x10 2x10 2x10   Alternating toe extension  2x10 reps B 2x10 reps B    Half kneeling forward leans for DF 1x10 reps  Manual pressure to talus to promote DF      Heel toe raises standing 2x10   2x10   HEP review       Lateral step up       Heel raises off step with heel off step 3x10   3x10    Balance and reach forward       Squat heel raises   1x10    SL squat heel raises       Hip abduction       Bike 5min      Rocker board 5min      Resisted ankle DF/PF/IV/EV  1x10 reps   Hold 8s  Rt only  Manual resistance     Alternating toe raises  2x10 B  2x10 B   Balance on Airex   [id number]  3x8 B rotation with blue medicine ball     Michelle step overs       Calf stretch   3x1min 3x1min   Weight shifts on airex   3x10 B    SLS on airex   6x10s B    Balance on step/airex   3x10B     Lateral walking   8x15' with red band on feet  Shoes on 8x6' with red band on feet barefoot   Inversion eversion SL stance    [id number] Rt leg each way   Towel  with toes    3x10     HEP:  [employer]  Access Code: [email or web address]  URL: [email or web address]  Date: 09/24/2021  Prepared by: Soraida Torres    Exercises  Towel Scrunches - 2 x daily - 7 x weekly - 2 sets - 10 reps  Seated Figure 4 Piriformis Stretch - 2 x daily - 7 x weekly - 2 sets - 10 reps  Seated Heel Toe Raises - 2 x daily - 7 x weekly - 2 sets - 10 reps  Access Code: [email or web address]  URL: [email or web address]/  Date: 09/30/2021  Prepared by: Soraida Torres    Exercises  Long Sitting Ankle Inversion with Resistance - 2 x daily - 7 x weekly - 2 sets - 10 reps  Long Sitting Ankle Plantar Flexion with Resistance - 2 x daily - 7 x weekly - 2 sets - 10 reps  Long Sitting Ankle Eversion with Resistance - 2 x daily - 7 x weekly - 2 sets - 10 reps  Long Sitting Ankle Dorsiflexion with Anchored Resistance - 2 x daily - 7 x weekly - 2 sets - 10 reps  Eccentric Heel Lowering on Step - 2 x daily - 7 x weekly - 2 sets - 10 reps  Standing Heel Raise - 2 x daily - 7 x weekly - 2 sets - 10 reps  Heel Toe Raises with Counter Support - 2 x daily - 7 x weekly - 2 sets - 10 reps      MANUAL THERAPY: (15 minutes): Joint mobilization, Soft tissue mobilization and Manipulation was utilized and necessary because of the patient's restricted joint motion, painful spasm, loss of articular motion and restricted motion of soft tissue. Patient prone:  -STM with Gua sha tool on plantar aspect of Rt foot, achilles, plantar fascia, heel cup      Patient supine:  -traction for Rt great toe FL and Ex  -STM with hands on plantar aspect of Rt foot, achilles, plantar fascia, heel cup  -Talocrural mobilization to increase DF with dysem   -Subtalar mobilization to increase IV/EV with dysem  -fat pad mobility with dysem       (Used abbreviations: MET - muscle energy technique; PNF - proprioceptive neuromuscular facilitation; NMR - neuromuscular re-education; AP - anterior to posterior; PA - posterior to anterior)    MODALITIES: (0 minutes):      None today       TREATMENT/SESSION ASSESSMENT:  Trey Odom tolerated treatment well with decreased pain post session. Patient demonstrated improved heel strike post session with little to no pain. Patient demonstrated difficulty with pronating foot on incline due to shifting weight on lateral edge of heel for balance. Patient demonstrated improved hip strength and endurance during lateral walking while barefoot and patient reported fatigue after session. RECOMMENDATIONS/INTENT FOR NEXT TREATMENT SESSION: \"Next visit will focus on advancements to more challenging activities\".     PAIN: Initial: 6/10 Post Session:  1/10 seated, 3 standing     Total Treatment Billable Duration: 53 minutes  PT Patient Time In/Time Out  Time In: 0930  Time Out: KADEN Bailey

## 2021-10-15 ENCOUNTER — HOSPITAL ENCOUNTER (OUTPATIENT)
Dept: PHYSICAL THERAPY | Age: 71
Discharge: HOME | End: 2021-10-15
Payer: MEDICARE

## 2021-10-15 PROCEDURE — 97110 THERAPEUTIC EXERCISES: CPT

## 2021-10-15 PROCEDURE — 97140 MANUAL THERAPY 1/> REGIONS: CPT

## 2021-10-15 NOTE — PROGRESS NOTES
Karla Laguna  : 1950  [place]  [place] [address] at [place] [address], [address] [facility]  [phone]   [phone]      OUTPATIENT PHYSICAL THERAPY: Daily Treatment Note 10/15/2021  Visit Count:  7          TREATMENT PLAN:  Effective Dates: 2021 TO 2021 (90 days). Frequency/Duration: 2 times a week for 90 Days    ICD-10: Treatment Diagnosis:   Difficulty in walking, not elsewhere classified (R26.2)  Pain in right ankle and joints of right foot (M25.571)  Stiffness of right ankle, not elsewhere classified (M25.671) Future Appointments   Date Time Provider Brad Izaguirre   10/19/2021  9:30 AM Satira Pore, PT, DPT [facility]   10/21/2021  9:30 AM Satira Pore, PT, DPT [facility]   10/26/2021  7:00 PM Satira Pore, PT, DPT [facility]   2021  9:30 AM Satira Pore, PT, DPT [facility]   2021  9:30 AM Satira Pore, PT, DPT [facility]          PRE-TREATMENT SYMPTOMS/COMPLAINTS:  Patient reports increased pain yesterday from increased walking distance and reported she borrowed shoes from a neighbor and it seemed to help. Patient reports she has tried everything and nothing has helped so she wants to get an insert or something to help decrease pain. MEDICATIONS REVIEWED:  10/15/2021   TREATMENT:   (In addition to Assessment/Re-Assessment sessions the following treatments were rendered)    THERAPEUTIC EXERCISE: (30 minutes):  Exercises per grid below to improve mobility, strength and balance. Required minimal visual and verbal cues to promote proper body alignment and promote proper body posture. Progressed resistance, range and complexity of movement as indicated.      Date:  2021 Date:  10/5/2021 Date:  10/8/2021   Date:  10/12/2021   Date:  10/15/2021   Activity/Exercise Parameters Parameters Parameters Parameters Parameters Scifit bike  5min  Level 3.5 5min   Level 4 5min   Level 4.5 then had to decrease it to 3.5 at 2min due to pain 5:30 secmin  Level 4   Towel scrunches  2x10 2x10 2x10    Alternating toe extension  2x10 reps B 2x10 reps B     Half kneeling forward leans for DF 1x10 reps  Manual pressure to talus to promote DF       Heel toe raises standing 2x10   2x10    HEP review        Lateral step up        Heel raises off step with heel off step 3x10   3x10     Balance and reach forward        Squat heel raises   1x10     SL squat heel raises        Hip abduction        Bike 5min       Rocker board 5min       Resisted ankle DF/PF/IV/EV  1x10 reps   Hold 8s  Rt only  Manual resistance      Alternating toe raises  2x10 B  2x10 B    Balance on Airex   [id number]  3x8 B rotation with blue medicine ball      Michelle step overs        Calf stretch   3x1min 3x1min    Weight shifts on airex   3x10 B     SLS on airex   6x10s B  6x10 B   Balance on step/airex   3x10B      Lateral walking   8x15' with red band on feet  Shoes on 8x6' with red band on feet barefoot 4x30'  Red band around feet with shoes   Inversion eversion SL stance    [id number] Rt leg each way    Towel  with toes    3x10    Rocker board     [id number]   Posterior pelvic tilt     4x10  5sec hold   Post pelvic tilt with march     4x10 B  Slow and controlled   Dead bug     2x10  Arms only     HEP:  [employer]  Access Code: [email or web address]  URL: [email or web address]  Date: 09/24/2021  Prepared by: Shon Choudhary    Exercises  Towel Scrunches - 2 x daily - 7 x weekly - 2 sets - 10 reps  Seated Figure 4 Piriformis Stretch - 2 x daily - 7 x weekly - 2 sets - 10 reps  Seated Heel Toe Raises - 2 x daily - 7 x weekly - 2 sets - 10 reps  Access Code: [email or web address]  URL: [email or web address]  Date: 09/30/2021  Prepared by: Shon Choudhary    Exercises  Long Sitting Ankle Inversion with Resistance - 2 x daily - 7 x weekly - 2 sets - 10 reps  Long Sitting Ankle Plantar Flexion with Resistance - 2 x daily - 7 x weekly - 2 sets - 10 reps  Long Sitting Ankle Eversion with Resistance - 2 x daily - 7 x weekly - 2 sets - 10 reps  Long Sitting Ankle Dorsiflexion with Anchored Resistance - 2 x daily - 7 x weekly - 2 sets - 10 reps  Eccentric Heel Lowering on Step - 2 x daily - 7 x weekly - 2 sets - 10 reps  Standing Heel Raise - 2 x daily - 7 x weekly - 2 sets - 10 reps  Heel Toe Raises with Counter Support - 2 x daily - 7 x weekly - 2 sets - 10 reps    Access Code: [email or web address]  URL: [email or web address] [employer]/  Date: 10/15/2021  Prepared by: Elisa Pinzon    Exercises  Supine Posterior Pelvic Tilt - 2 x daily - 7 x weekly - 10 reps - 2 sets - 10s hold  Supine March with Posterior Pelvic Tilt - 2 x daily - 7 x weekly - 2 sets - 10 reps  Dead Bug - 2 x daily - 7 x weekly - 2 sets - 10 reps      MANUAL THERAPY: (25 minutes): Joint mobilization, Soft tissue mobilization and Manipulation was utilized and necessary because of the patient's restricted joint motion, painful spasm, loss of articular motion and restricted motion of soft tissue. Patient prone:  -STM with Gua sha tool on plantar aspect of Rt foot, achilles, plantar fascia, heel cup      Patient supine:  -traction for Rt great toe FL and Ex  -STM with hands on plantar aspect of Rt foot, achilles, plantar fascia, heel cup  -Talocrural mobilization to increase DF with dysem   -Subtalar mobilization to increase IV/EV with dysem  -fat pad mobility with dysem       (Used abbreviations: MET - muscle energy technique; PNF - proprioceptive neuromuscular facilitation; NMR - neuromuscular re-education; AP - anterior to posterior; PA - posterior to anterior)    MODALITIES: (0 minutes):      None today       TREATMENT/SESSION ASSESSMENT:  Sudha Sesay tolerated treatment well with decreased pain post session. Patient demonstrated improved balance from last sessions but still demonstrated trendelenburg and anterior pelvic tilt. Patient demonstrated difficulty with posterior pelvic tilts and reported the motion was \"awkward\". Patient demonstrated improved hip strength and endurance during lateral walking while barefoot and patient reported fatigue after session. RECOMMENDATIONS/INTENT FOR NEXT TREATMENT SESSION: \"Next visit will focus on advancements to more challenging activities\".     PAIN: Initial: 4/10 Post Session:  2/10     Total Treatment Billable Duration: 55 minutes  PT Patient Time In/Time Out  Time In: 0930  Time Out: KADEN Bailey

## 2021-10-19 ENCOUNTER — HOSPITAL ENCOUNTER (OUTPATIENT)
Dept: PHYSICAL THERAPY | Age: 71
Discharge: HOME | End: 2021-10-19
Payer: MEDICARE

## 2021-10-19 PROCEDURE — 97110 THERAPEUTIC EXERCISES: CPT

## 2021-10-19 PROCEDURE — 97140 MANUAL THERAPY 1/> REGIONS: CPT

## 2021-10-19 NOTE — PROGRESS NOTES
Trey Odom  : 1950  [place]  [place] [address] at [place] [address], [address] [facility]  [phone]   [phone]      OUTPATIENT PHYSICAL THERAPY: Daily Treatment Note 10/19/2021  Visit Count:  8          TREATMENT PLAN:  Effective Dates: 2021 TO 2021 (90 days). Frequency/Duration: 2 times a week for 90 Days    ICD-10: Treatment Diagnosis:   Difficulty in walking, not elsewhere classified (R26.2)  Pain in right ankle and joints of right foot (M25.571)  Stiffness of right ankle, not elsewhere classified (M25.671) Future Appointments   Date Time Provider Brad Izaguirre   10/19/2021  9:30 AM Barbie Sosa, PT, DPT [facility]   10/21/2021  9:30 AM Barbie Sosa, PT, DPT [facility]   10/26/2021  7:00 PM Barbie Pickenss, PT, DPT [facility]   2021  9:30 AM Barbie Ian, PT, DPT [facility]   2021  9:30 AM Barbie Ian, PT, DPT [facility]          PRE-TREATMENT SYMPTOMS/COMPLAINTS:  Patient reports she did a lot of walking over the weekend at her [employer] game and her pain was 5/10. It is 5/10 today too and patient is unable to bear weight on heel still. Patient brought a cam boot to therapy to see if it is appropriate for her. Patient reported she has been massaging her heel more at home and thinks it is helping. MEDICATIONS REVIEWED:  10/19/2021   TREATMENT:   (In addition to Assessment/Re-Assessment sessions the following treatments were rendered)    THERAPEUTIC EXERCISE: (40 minutes):  Exercises per grid below to improve mobility, strength and balance. Required minimal visual and verbal cues to promote proper body alignment and promote proper body posture. Progressed resistance, range and complexity of movement as indicated.      Date:  10/12/2021   Date:  10/15/2021 Date:  10/19/2021     Activity/Exercise Parameters Parameters Parameters     Scifit bike 5min   Level 4.5 then had to decrease it to 3.5 at 2min due to pain 5:30 secmin  Level 4 5min  Level 4     Towel scrunches 2x10       Alternating toe extension        Half kneeling forward leans for DF        Heel toe raises standing 2x10       HEP review        Lateral step up        Heel raises off step with heel off step        Balance and reach forward        Squat heel raises        SL squat heel raises        Hip abduction        Bike        Rocker board        Resisted ankle DF/PF/IV/EV        Alternating toe raises 2x10 B       Balance on Airex         Michelle step overs        Calf stretch 3x1min       Weight shifts on airex        SLS on airex  6x10 B      Balance on step/airex        Lateral walking 8x6' with red band on feet barefoot 4x30'  Red band around feet with shoes      Inversion eversion SL stance [id number] Rt leg each way       Towel  with toes 3x10       Rocker board  [id number]      Posterior pelvic tilt  4x10  5sec hold      Post pelvic tilt with march  4x10 B  Slow and controlled      Dead bug  2x10  Arms only      education   10min  Education on boot wear/management     glut sets   3x10     bridge   2x10     Supine clamshells   3x10 B with isometric hold     Bridge with clamshell   2x10  5 hold with 3 abducted movements     Gait training   5min in cam boot                               HEP:  [employer]  Access Code: [email or web address]  URL: [email or web address]  Date: 09/24/2021  Prepared by: Sherron Gonzales    Exercises  Towel Scrunches - 2 x daily - 7 x weekly - 2 sets - 10 reps  Seated Figure 4 Piriformis Stretch - 2 x daily - 7 x weekly - 2 sets - 10 reps  Seated Heel Toe Raises - 2 x daily - 7 x weekly - 2 sets - 10 reps  Access Code: [email or web address]  URL: [email or web address] [employer]/  Date: 09/30/2021  Prepared by: Sherron Gonzales    Exercises  Long Sitting Ankle Inversion with Resistance - 2 x daily - 7 x weekly - 2 sets - 10 reps  Long Sitting Ankle Plantar Flexion with Resistance - 2 x daily - 7 x weekly - 2 sets - 10 reps  Long Sitting Ankle Eversion with Resistance - 2 x daily - 7 x weekly - 2 sets - 10 reps  Long Sitting Ankle Dorsiflexion with Anchored Resistance - 2 x daily - 7 x weekly - 2 sets - 10 reps  Eccentric Heel Lowering on Step - 2 x daily - 7 x weekly - 2 sets - 10 reps  Standing Heel Raise - 2 x daily - 7 x weekly - 2 sets - 10 reps  Heel Toe Raises with Counter Support - 2 x daily - 7 x weekly - 2 sets - 10 reps    Access Code: [email or web address]  URL: [email or web address]  Date: 10/15/2021  Prepared by: [place] Shetty    Exercises  Supine Posterior Pelvic Tilt - 2 x daily - 7 x weekly - 10 reps - 2 sets - 10s hold  Supine March with Posterior Pelvic Tilt - 2 x daily - 7 x weekly - 2 sets - 10 reps  Dead Bug - 2 x daily - 7 x weekly - 2 sets - 10 reps      MANUAL THERAPY: (13 minutes): Joint mobilization, Soft tissue mobilization and Manipulation was utilized and necessary because of the patient's restricted joint motion, painful spasm, loss of articular motion and restricted motion of soft tissue. Patient prone:  -STM with hands on plantar aspect of Rt foot, achilles, plantar fascia, heel cup  -STM with hands on calf      Patient supine: [NOT DONE]  -traction for Rt great toe FL and Ex  -STM with hands on plantar aspect of Rt foot, achilles, plantar fascia, heel cup  -Talocrural mobilization to increase DF with dysem   -Subtalar mobilization to increase IV/EV with dysem  -fat pad mobility with dysem       (Used abbreviations: MET - muscle energy technique; PNF - proprioceptive neuromuscular facilitation; NMR - neuromuscular re-education; AP - anterior to posterior; PA - posterior to anterior)    MODALITIES: (0 minutes):      None today       TREATMENT/SESSION ASSESSMENT:  Estefania Lopez tolerated treatment well with decreased pain post session.  Patient had difficulty walking in cam boot and still demonstrated decreased heel strike during gait. Patient tolerated manual well with no increase in pain but had increase in pain on heel during weight bearing activities. Patient was guarded by plantar flexing Rt ankle during standing exercises and during ambulation. Patient showed signs of fatigue during hip strengthening exercises but was able to complete sets and reps. She required longer rest breaks. RECOMMENDATIONS/INTENT FOR NEXT TREATMENT SESSION: \"Next visit will focus on advancements to more challenging activities\".     PAIN: Initial: 5/10 Post Session:  2/10     Total Treatment Billable Duration: 53 minutes  PT Patient Time In/Time Out  Time In: 0930  Time Out: KADEN Bailey

## 2021-10-21 ENCOUNTER — HOSPITAL ENCOUNTER (OUTPATIENT)
Dept: PHYSICAL THERAPY | Age: 71
Discharge: HOME | End: 2021-10-21
Payer: MEDICARE

## 2021-10-21 PROCEDURE — 97110 THERAPEUTIC EXERCISES: CPT

## 2021-10-21 PROCEDURE — 97140 MANUAL THERAPY 1/> REGIONS: CPT

## 2021-10-21 NOTE — PROGRESS NOTES
Artis Norton  : 1950  [place]  [place]: [place] [address] [place] at [place] [address], [address] [facility]  [phone]   [phone]      OUTPATIENT PHYSICAL THERAPY: Daily Treatment Note 10/21/2021  Visit Count:  9          TREATMENT PLAN:  Effective Dates: 2021 TO 2021 (90 days). Frequency/Duration: 2 times a week for 90 Days    ICD-10: Treatment Diagnosis:   Difficulty in walking, not elsewhere classified (R26.2)  Pain in right ankle and joints of right foot (M25.571)  Stiffness of right ankle, not elsewhere classified (M25.671) Future Appointments   Date Time Provider Brad Izaguirre   10/26/2021  7:00 PM Annabel Esteves PT, DPT [facility]   2021  9:30 AM Annabel Esteves PT, DPT [facility]   2021  9:30 AM Annabel Esteves PT, DPT [facility]          PRE-TREATMENT SYMPTOMS/COMPLAINTS:  Patient reports she felt the boot was helping after being in the boot an entire day with walking around her [employer]. She did report decrease in pain in heel but increase in discomfort in her toes. Patient reports massaging the heel helps. MEDICATIONS REVIEWED:  10/21/2021   TREATMENT:   (In addition to Assessment/Re-Assessment sessions the following treatments were rendered)    THERAPEUTIC EXERCISE: (40 minutes):  Exercises per grid below to improve mobility, strength and balance. Required minimal visual and verbal cues to promote proper body alignment and promote proper body posture. Progressed resistance, range and complexity of movement as indicated.      Date:  10/12/2021   Date:  10/15/2021 Date:  10/19/2021 Date:  10/21/2021    Activity/Exercise Parameters Parameters Parameters Parameters    Scifit bike 5min   Level 4.5 then had to decrease it to 3.5 at 2min due to pain 5:30 secmin  Level 4 5min  Level 4 5min   Level 4    Towel scrunches 2x10       Alternating toe extension Half kneeling forward leans for DF        Heel toe raises standing 2x10       HEP review        Lateral step up        Heel raises off step with heel off step        Balance and reach forward        Squat heel raises        SL squat heel raises        Hip abduction        Bike        Rocker board        Resisted ankle DF/PF/IV/EV        Alternating toe raises 2x10 B       Balance on Airex         Michelle step overs        Calf stretch 3x1min       Weight shifts on airex        SLS on airex  6x10 B      Balance on step/airex    [id number] SLS  3x20s tandem with UE holding ball    Lateral walking 8x6' with red band on feet barefoot 4x30'  Red band around feet with shoes      Inversion eversion SL stance [id number] Rt leg each way       Towel  with toes 3x10       Rocker board  [id number]      Posterior pelvic tilt  4x10  5sec hold  2x10  10s hold    Post pelvic tilt with march  4x10 B  Slow and controlled  5x3 each leg    Dead bug  2x10  Arms only      education   10min  Education on boot wear/management     glut sets   3x10 1x10    bridge   2x10 2x10    Supine clamshells   3x10 B with isometric hold     Bridge with clamshell   2x10  5 hold with 3 abducted movements     Gait training   5min in cam boot     Bridge with ball holds    2x10    Sideline clamshells    2x10 B    Sideline abduction    1x10 B    TKE    3x10 Rt  5s hold                              HEP:  [employer]  Access Code: [email or web address]  URL: [email or web address]  Date: 09/24/2021  Prepared by: Donald Part    Exercises  Towel Scrunches - 2 x daily - 7 x weekly - 2 sets - 10 reps  Seated Figure 4 Piriformis Stretch - 2 x daily - 7 x weekly - 2 sets - 10 reps  Seated Heel Toe Raises - 2 x daily - 7 x weekly - 2 sets - 10 reps  Access Code: [email or web address]  URL: [email or web address]  Date: 09/30/2021  Prepared by: Donald Part    Exercises  Long Sitting Ankle Inversion with Resistance - 2 x daily - 7 x weekly - 2 sets - 10 reps  Long Sitting Ankle Plantar Flexion with Resistance - 2 x daily - 7 x weekly - 2 sets - 10 reps  Long Sitting Ankle Eversion with Resistance - 2 x daily - 7 x weekly - 2 sets - 10 reps  Long Sitting Ankle Dorsiflexion with Anchored Resistance - 2 x daily - 7 x weekly - 2 sets - 10 reps  Eccentric Heel Lowering on Step - 2 x daily - 7 x weekly - 2 sets - 10 reps  Standing Heel Raise - 2 x daily - 7 x weekly - 2 sets - 10 reps  Heel Toe Raises with Counter Support - 2 x daily - 7 x weekly - 2 sets - 10 reps    Access Code: [email or web address]  URL: [email or web address]  Date: 10/15/2021  Prepared by: Naeem Lugoing    Exercises  Supine Posterior Pelvic Tilt - 2 x daily - 7 x weekly - 10 reps - 2 sets - 10s hold  Supine March with Posterior Pelvic Tilt - 2 x daily - 7 x weekly - 2 sets - 10 reps  Dead Bug - 2 x daily - 7 x weekly - 2 sets - 10 reps      MANUAL THERAPY: (13 minutes): Joint mobilization, Soft tissue mobilization and Manipulation was utilized and necessary because of the patient's restricted joint motion, painful spasm, loss of articular motion and restricted motion of soft tissue. Patient prone:  -STM with hands on plantar aspect of Rt foot, achilles, plantar fascia, heel cup  -STM with hands and guasha on calf      Patient supine: [NOT DONE]  -traction for Rt great toe FL and Ex  -STM with hands on plantar aspect of Rt foot, achilles, plantar fascia, heel cup  -Talocrural mobilization to increase DF with dysem   -Subtalar mobilization to increase IV/EV with dysem  -fat pad mobility with dysem       (Used abbreviations: MET - muscle energy technique; PNF - proprioceptive neuromuscular facilitation; NMR - neuromuscular re-education; AP - anterior to posterior; PA - posterior to anterior)    MODALITIES: (0 minutes):      None today       TREATMENT/SESSION ASSESSMENT:  Katrina Malagon tolerated treatment well with decreased pain post session.  Patient did not come in in cam boot and left it in the car since she cannot wear it while driving. Patient demonstrated improved balance on airex during tandem but had difficulty with SLS and continued to try to use the other leg to balance on the stance leg. Patient required cues to prevent this from happening but after a few seconds she began to compensate again. RECOMMENDATIONS/INTENT FOR NEXT TREATMENT SESSION: \"Next visit will focus on advancements to more challenging activities\".     PAIN: Initial: 2.5/10 Post Session:  2/10     Total Treatment Billable Duration: 53 minutes  PT Patient Time In/Time Out  Time In: 0930  Time Out: KADEN Bailey

## 2021-10-26 ENCOUNTER — HOSPITAL ENCOUNTER (OUTPATIENT)
Dept: PHYSICAL THERAPY | Age: 71
Discharge: HOME | End: 2021-10-26
Payer: MEDICARE

## 2021-10-26 NOTE — PROGRESS NOTES
Estephania Neely  : 1950  [place]  [place] [address]  [phone]   [phone]        OUTPATIENT DAILY NOTE    NAME/AGE/GENDER: Estephania Neely is a 80 y.o. female. DATE: 10/26/2021    SUBJECTIVE:  Patient cancelled appointment for today on a previous date. Pt is OOT for vacation.     Brenden Price, PT, DPT, COMT

## 2021-10-29 ENCOUNTER — APPOINTMENT (OUTPATIENT)
Dept: PHYSICAL THERAPY | Age: 71
End: 2021-10-29
Payer: MEDICARE

## 2021-11-02 ENCOUNTER — HOSPITAL ENCOUNTER (OUTPATIENT)
Dept: PHYSICAL THERAPY | Age: 71
Discharge: HOME | End: 2021-11-02
Payer: MEDICARE

## 2021-11-02 PROCEDURE — 97140 MANUAL THERAPY 1/> REGIONS: CPT

## 2021-11-02 PROCEDURE — 97110 THERAPEUTIC EXERCISES: CPT

## 2021-11-02 NOTE — PROGRESS NOTES
Angel Carvajal  : 1950  [place]  [place]: [place] [address] [place] [address] at [place] [address], [address] [facility]  [phone]   [phone]      OUTPATIENT PHYSICAL THERAPY: Daily Treatment Note 2021  Visit Count:  10          TREATMENT PLAN:  Effective Dates: 2021 TO 2021 (90 days). Frequency/Duration: 2 times a week for 90 Days    ICD-10: Treatment Diagnosis:   Difficulty in walking, not elsewhere classified (R26.2)  Pain in right ankle and joints of right foot (M25.571)  Stiffness of right ankle, not elsewhere classified (M25.671) Future Appointments   Date Time Provider Brad Izaguirre   2021  9:30 AM Lali Chappell, PT, DPT [facility]          PRE-TREATMENT SYMPTOMS/COMPLAINTS:  Patient reports she felt the boot did help after using it while on vacation. Patient reported she \"could still feel it\" but it was not painful or as uncomfortable as normal.     MEDICATIONS REVIEWED:  2021   TREATMENT:   (In addition to Assessment/Re-Assessment sessions the following treatments were rendered)    THERAPEUTIC EXERCISE: (42 minutes):  Exercises per grid below to improve mobility, strength and balance. Required minimal visual and verbal cues to promote proper body alignment and promote proper body posture. Progressed resistance, range and complexity of movement as indicated.      Date:  10/12/2021   Date:  10/15/2021 Date:  10/19/2021 Date:  10/21/2021 Date:  2021   Activity/Exercise Parameters Parameters Parameters Parameters Parameters        PN measurements   Scifit bike 5min   Level 4.5 then had to decrease it to 3.5 at 2min due to pain 5:30 secmin  Level 4 5min  Level 4 5min   Level 4 8 min   Level 4   Towel scrunches 2x10       Alternating toe extension        Half kneeling forward leans for DF        Heel toe raises standing 2x10       HEP review        Lateral step up        Heel raises off step with heel off step        Balance and reach forward        Squat heel raises        SL squat heel raises        Hip abduction        Bike        Rocker board        Resisted ankle DF/PF/IV/EV        Alternating toe raises 2x10 B       Balance on Airex         Michelle step overs        Calf stretch 3x1min       Weight shifts on airex        SLS on airex  6x10 B   6x10 B   Balance on step/airex    [id number] SLS  3x20s tandem with UE holding ball [id number] SLS  3x20s tandem with UE holding ball   Lateral walking 8x6' with red band on feet barefoot 4x30'  Red band around feet with shoes      Inversion eversion SL stance [id number] Rt leg each way       Towel  with toes 3x10       Rocker board  [id number]      Posterior pelvic tilt  4x10  5sec hold  2x10  10s hold    Post pelvic tilt with march  4x10 B  Slow and controlled  5x3 each leg    Dead bug  2x10  Arms only      education   10min  Education on boot wear/management     glut sets   3x10 1x10    bridge   2x10 2x10    Supine clamshells   3x10 B with isometric hold     Bridge with clamshell   2x10  5 hold with 3 abducted movements     Gait training   5min in cam boot  5min   Bridge with ball holds    2x10    Sideline clamshells    2x10 B    Sideline abduction    1x10 B    TKE    3x10 Rt  5s hold                              HEP:  [employer]  Access Code: [email or web address]  URL: [email or web address]  Date: 09/24/2021  Prepared by: Lawerance Murphy    Exercises  Towel Scrunches - 2 x daily - 7 x weekly - 2 sets - 10 reps  Seated Figure 4 Piriformis Stretch - 2 x daily - 7 x weekly - 2 sets - 10 reps  Seated Heel Toe Raises - 2 x daily - 7 x weekly - 2 sets - 10 reps  Access Code: [email or web address]  URL: [email or web address]  Date: 09/30/2021  Prepared by: Lawerance Murphy    Exercises  Long Sitting Ankle Inversion with Resistance - 2 x daily - 7 x weekly - 2 sets - 10 reps  Long Sitting Ankle Plantar Flexion with Resistance - 2 x daily - 7 x weekly - 2 sets - 10 reps  Long Sitting Ankle Eversion with Resistance - 2 x daily - 7 x weekly - 2 sets - 10 reps  Long Sitting Ankle Dorsiflexion with Anchored Resistance - 2 x daily - 7 x weekly - 2 sets - 10 reps  Eccentric Heel Lowering on Step - 2 x daily - 7 x weekly - 2 sets - 10 reps  Standing Heel Raise - 2 x daily - 7 x weekly - 2 sets - 10 reps  Heel Toe Raises with Counter Support - 2 x daily - 7 x weekly - 2 sets - 10 reps    Access Code: [email or web address]  URL: [email or web address]  Date: 10/15/2021  Prepared by: Viki Davila    Exercises  Supine Posterior Pelvic Tilt - 2 x daily - 7 x weekly - 10 reps - 2 sets - 10s hold  Supine March with Posterior Pelvic Tilt - 2 x daily - 7 x weekly - 2 sets - 10 reps  Dead Bug - 2 x daily - 7 x weekly - 2 sets - 10 reps      MANUAL THERAPY: (13 minutes): Joint mobilization, Soft tissue mobilization and Manipulation was utilized and necessary because of the patient's restricted joint motion, painful spasm, loss of articular motion and restricted motion of soft tissue. Patient prone: [NOT DONE 11/2/2021]  -STM with hands on plantar aspect of Rt foot, achilles, plantar fascia, heel cup  -STM with hands and guasha on calf      Patient supine:   -traction for Rt great toe FL and Ex  -STM with hands on plantar aspect of Rt foot, achilles, plantar fascia, heel cup  -Talocrural mobilization to increase DF with dysem  -Subtalar mobilization to increase IV/EV with dysem  -fat pad mobility with dysem       (Used abbreviations: MET - muscle energy technique; PNF - proprioceptive neuromuscular facilitation; NMR - neuromuscular re-education; AP - anterior to posterior; PA - posterior to anterior)    MODALITIES: (0 minutes):      None today       TREATMENT/SESSION ASSESSMENT:  Aime Kennedy tolerated being out of the boot during treatment today. Patient tolerated manual well with only minor increase in discomfort when extending the Rt great toe.  Patient did not appear as sensitive to manual as she normally is. See progress note for improvements in strength and ROM. Patient encouraged to wear boot outside of house and remove when in the home. RECOMMENDATIONS/INTENT FOR NEXT TREATMENT SESSION: \"Next visit will focus on advancements to more challenging activities\".     PAIN: Initial: 1/10 Post Session:  2/10     Total Treatment Billable Duration: 55 minutes  PT Patient Time In/Time Out  Time In: 0930  Time Out: KADEN Bailey

## 2021-11-02 NOTE — PROGRESS NOTES
Chetan Guzman  : 1950  [place] / [place] / Product Type: Medicare /  [address]  [facility], [address]  [phone]   [phone]         OUTPATIENT PHYSICAL THERAPY:Progress Report 2021    ICD-10: Treatment Diagnosis:   Difficulty in walking, not elsewhere classified (R26.2)  Pain in right ankle and joints of right foot (M25.571)  Stiffness of right ankle, not elsewhere classified (M25.671)    PRECAUTIONS/ALLERGIES:   Codeine and Mometasone furoate     FALL RISK SCORE: 1 (? 5 = High Risk)    MD ORDERS: Eval and Treat  MEDICAL/REFERRING DIAGNOSIS:  Pain of right heel (M79.671)    DATE OF ONSET: Beginning of Summer    REFERRING PHYSICIAN: Melanie Shah MD    RETURN PHYSICIAN APPOINTMENT: TBD by patient      Ambulatory/Rehab Services H2 Model Falls Risk Assessment    Risk Factors:       No Risk Factors Identified Ability to Rise from Chair:       (1)  Pushes up, successful in one attempt    Falls Prevention Plan:       No modifications necessary   Total: (5 or greater = High Risk): 1    © [facility] [place] [facility]. All Rights Reserved. [facility] Patent #[id number]. Federal Law prohibits the replication, distribution or use without written permission from [facility]  [facility]rayan Alem [place] Report: Ms. Chetan Guzman has attended 10 physical therapy session including initial evaluation as of 21. Chetan Guzman demonstrates improved Rt ankle strength, improved Rt ankle ROM, improved tolerance of ADLs, improved but still decreased functional mobility and activity tolerance. She has been in a CAM boot for 2 weeks and reports she is doing better however still notes tenderness mostly at Rt lateral heel during WB and palpation. Patient still has difficulty performing heel strike during gait and resorts to striking with the ball of her foot to reduce pain.  When cued, patient is able to correct walking form but quickly resorts back to incorrect form even with minimal pain in heel. Patient has met 2/5 STG and 2/5 LTG  According to their responses on the Foot and Ankle Ability Measure, Corrina Nelson is now 69.0% limited by her ankle pain and dysfunction in their ability to participate in ADLs and their overall functional tolerance. Recommending skilled PT: manual therapeutic techniques (as appropriate), therapeutic exercises and activities, balance and comprehensive home exercises program to address current impairment. Corrina Nelson will benefit from skilled PT (medically necessary) to address above deficits affecting participation in basic ADLs and overall functional tolerance. INITIAL ASSESSMENT:  Ms. Corrina Nelson has attended 1 physical therapy session including initial evaluation as of 9/24/2021. Corrina Nelson demonstrates decreased Rt ankle strength, decreased Rt ankle ROM, decreased tolerance of ADLs, decreased functional mobility, and decreased activity tolerance, all consistent with S/S of Rt dorsiflexion and eversion deficit from heel pain with weightbearing. Patient demonstrates tenderness mostly at Rt lateral heel during weightbearing and palpation. Slight edema surrounding Rt lateral malleoli with no tenderness to palpation. Special tests negative ankle sprain, Fx, and achilles involvement at this time. According to their responses on the Foot and Ankle Ability Measure, Corrina Nelson is 38.1% limited by their ankle pain and dysfunction in their ability to participate in ADLs and their overall functional tolerance. Recommending skilled PT: manual therapeutic techniques (as appropriate), therapeutic exercises and activities, balance and comprehensive home exercises program to address current impairment. Corrina Nelson will benefit from skilled PT (medically necessary) to address above deficits affecting participation in basic ADLs and overall functional tolerance.       PROBLEM LIST (Impacting functional limitations):  1. Decreased Strength  2. Decreased ADL/Functional Activities  3. Decreased Transfer Abilities  4. Decreased Ambulation Ability/Technique  5. Decreased Balance  6. Increased Pain  7. Decreased Activity Tolerance  8. Decreased [place] with Home Exercise Program INTERVENTIONS PLANNED:  1. Balance Exercise  2. Bed Mobility  3. Cold  4. Cryotherapy  5. Family Education  6. Gait Training  7. Heat  8. Home Exercise Program (HEP)  9. Manual Therapy  10. Neuromuscular Re-education/Strengthening  11. Range of Motion (ROM)  12. Therapeutic Activites  13. Therapeutic Exercise/Strengthening  14. Transfer Training  15. Ultrasound (US)  16. Aquatic therapy  17. Electrical Stimulation  18. Vasopneumatic compression   19. Dry Needling for Pain control   TREATMENT PLAN:  Effective Dates: 9/24/2021 TO 12/23/2021 (90 days). Frequency/Duration: 2 times a week for 90 Days    SHORT-TERM FUNCTIONAL GOALS: Time Frame: 4 weeks  1. Lesa Gannon will be compliant with home exercise program within 4 weeks in order to improve active participation with management of patient's symptoms and/or functional deficits. -MET as of 11/2/2021  2. Lesa Gannon will report <=3/10 pain with walking >15 minutes in order to participate in daily exercise and daily activities. -Progress towards as of 11/2/2021  3. Lesa Gannon will be able to stand >=15 minutes with <2/10 pain to feet in order to participate in household duties without issues/compromise. -Progress towards as of 11/2/2021  4. Lesa Gannon  will improve Rt ankle dorsiflexion AROM from -3 to 0 (neutral) in order to show improvement in ankle ROM and tolerance for functional activity. -Progress towards as of 11/2/2021  5. Lesa Gannon will be report improved score on the Foot and Ankle Ability Measure from 52/84 to 58/84 to indicate improvement in functional independence. -MET as of 11/2/2021    DISCHARGE GOALS: Time Frame: 12 weeks  1.  Lesa Gannon will be independent with home exercise program within 8 weeks in order to improve independence with management of patient's symptoms and/or functional deficits. -MET as of 11/2/2021  2. Sudha Sesay will report <=2/10 pain with participation in activities of daily living and overall functional mobility including walking and stair ambulation. -Progress towards as of 11/2/2021  3. Sudha Sesay will be able to stand >=30 minutes without reports of increased Rt foot/ankle pain. -Progress towards as of 11/2/2021  4. Sudha Sesay will be report improved score on the Foot and Ankle Ability Measure from 58/84 to 64/84 to indicate improvement in functional independence. -Progress towards as of 11/2/2021  5. Sudha Sesay will improve ankle strength to >=4+/5 to improve tolerance of ADLs and improve overall functional mobility. -MET as of 11/2/2021    REHABILITATION POTENTIAL FOR STATED GOALS: GOOD    Regarding Gisela Grande's therapy, I certify that the treatment plan above will be carried out by a therapist or under their direction. Thank you for this referral,  KADEN Marcus       Referring Physician Signature: Ashley Spann MD            HISTORY:  All history obtained on 9/24/2021 unless otherwise noted. PATIENT STATED GOAL:   Patient would like to get back to walking without pain. HISTORY OF PRESENT INJURY/ILLNESS (REASON FOR REFERRAL):   Patient reports she was doing her hair at the beginning of summer and stepped on the plug of a curling iron and went up onto the ball of her foot and it felt like stone bruise behind her big toe and heel area. Then her heel really started hurting about a month ago. She went to a football game in August and walked around a lot and it increased her pain and it has not calmed down since. Patient reports she was wearing sandals during that game and enjoys wearing chacos because of the high arch support.  Patient reports when she lifts her big toe it causes the same pain on the lateral side of heel. Patient loves to walk and play with grandchildren and has not been able to do so in the last couple months. Patient reports pain increases throughout the day with increasing activity. Patient has night splint that she borrowed from friend and felt it was helping when she got up to urinate during the night because it relieves pressure. Does not walk around house barefoot due to pain. Pt reports ice helps reduce pain sometimes. Notes aleve also helps her pain. States she is having the most difficulty putting weight through her heel during any walking and standing, sit to stand. Pt states the pain can get as high as 9/10 and as low as 0/10 non weightbearing. PAST MEDICAL HISTORY/COMORBIDITIES:  Ms. Robina Kruger  has a past medical history of Hyperlipidemia (10/24/2018) and Rosacea (10/24/2018). Ms. Robina Kruger  has no past surgical history on file. Active Ambulatory Problems     Diagnosis Date Noted    Hyperlipidemia 10/24/2018    Rosacea 10/24/2018    Hyperglycemia 10/24/2018    ERRONEOUS ENCOUNTER--DISREGARD 09/29/2019     Resolved Ambulatory Problems     Diagnosis Date Noted    No Resolved Ambulatory Problems     No Additional Past Medical History       SOCIAL HISTORY/LIVING ENVIRONMENT:   Patient lives with  at home with stairs to enter home. Patient really enjoyed walking with grandchildren.     Social History     Socioeconomic History    Marital status:      Spouse name: Not on file    Number of children: Not on file    Years of education: Not on file    Highest education level: Not on file   Occupational History    Not on file   Tobacco Use    Smoking status: Never Smoker    Smokeless tobacco: Never Used   Substance and Sexual Activity    Alcohol use: Yes     Comment: occasional    Drug use: Never    Sexual activity: Not on file   Other Topics Concern    Not on file   Social History Narrative    Not on file     Social Determinants of Health Financial Resource Strain:     Difficulty of Paying Living Expenses:    Food Insecurity:     Worried About Running Out of Food in the Last Year:     [address] in the Last Year:    Transportation Needs:     Lack of Transportation (Medical):  Lack of Transportation (Non-Medical):    Physical Activity:     Days of Exercise per Week:     Minutes of Exercise per Session:    Stress:     Feeling of Stress :    Social Connections:     Frequency of Communication with Friends and Family:     Frequency of Social Gatherings with Friends and Family:     Attends Yazidi Services:     Active Member of Clubs or Organizations:     Attends Club or Organization Meetings:     Marital Status:    Intimate Partner Violence:     Fear of Current or Ex-Partner:     Emotionally Abused:     Physically Abused:     Sexually Abused:      PRIOR LEVEL OF FUNCTION/WOR/ACTIVITY:  Patient is retired but very active lifestyle through walking and playing with grandchildren. CURRENT MEDICATIONS:    Current Outpatient Medications:     metFORMIN (GLUCOPHAGE) 500 mg tablet, TAKE ONE TABLET BY MOUTH TWICE A DAY WITH MEAL(S), Disp: 180 Tab, Rfl: 3    simvastatin (ZOCOR) 40 mg tablet, Take 1 Tab by mouth nightly., Disp: 90 Tab, Rfl: 3    sulfacetamide sodium 9.8 % lotn, by Apply Externally route. 10% lotion, Disp: , Rfl:     ketoconazole (NIZORAL) 2 % shampoo, Apply  to affected area daily as needed for Itching., Disp: , Rfl:     furosemide (LASIX) 20 mg tablet, Take 1 Tab by mouth daily. , Disp: 30 Tab, Rfl: 1    Blood-Glucose Meter monitoring kit, Test daily as directed, Disp: 1 Kit, Rfl: 0    lancets misc, Test daily as directed, Disp: 1 Each, Rfl: 11    glucose blood VI test strips (ASCENSIA AUTODISC VI, ONE TOUCH ULTRA TEST VI) strip, Test daily as needed, Disp: 100 Strip, Rfl: 3     Date Last Reviewed:  11/2/2021   Number of Personal Factors/Comorbidities that affect the Plan of Care: 1-2: MODERATE COMPLEXITY EXAMINATION:   OBSERVATION/ORTHOSTATIC POSTURAL ASSESSMENT: Assessed @ Initial Visit:    -Pt sits with forward head and rounded shoulders which indicate tight anterior chest musculature, upper trapezius, and levator scapula and weak posterior scapula musculature and deep cervical flexors. Pt displays decreased core motor control indicating weak core and low back musculature.  -Observed patients feet in static standing: increased rearfoot valgus on Lt, unable to adequetly assess due to pain in weightbearing and unable to bear full weight in Rt heel.       BALANCE (SLS) Date:  9/24/2021    Date: 11/2/2021   Date:   Right Unable to balance on RLE due to pain >10 sec with minimal discomfort in achilles    Left [facility]        MEASUREMENTS:          Date:  9/24/2021  Date: 11/2/2021  Date:     Right Left Right Left Right Left   Ankle Circumference 25cm 24cm 25cm      Figure 8 49cm 49cm  49cm        AROM/PROM         Joint: Date:9/24/2021  Date: 11/2/2021    Date:    Active LE ROM Right (Degrees) Left (Degrees) Right (Degrees) Left (Degrees) Right (Degrees) Left (Degrees)   Hip Flexion         Hip Extension         Hip IR         Hip ER         Knee Extension         Knee Flexion         Ankle DF -3 0 -1 °      Ankle PF 64 58 65 °      Ankle IV 35 40 40 °      Ankle EV 18 35 24 °        STRENGTH         Joint: Date: 9/24/2021  Date: 11/2/2021  Date:     Right Left Right Left Right Left   Hip Abduction         Hip Adduction         Hip IR         Hip ER         Hip Flexion 5/5 5/5 5/5      Knee Extension 5/5 5/5 5/5      Knee Flexion 5/5 5/5 5/5      Ankle DF 5/5 5/5  Pain base of anterior and lateral MTP 5/5  Minimal discomfort      Ankle PF 5/5 5/5 5/5      Ankle IV 5/5 5/5 5/5      Ankle EV 5/5 5/5 5/5          SPECIAL TESTS: Assessed @ Initial Visit:    -Talar tilt: Negative B, restricted movement in Rt ankle   -Anterior drawer: Negative B   -Ankle impingement: Negative B   -Squeeze test: Negative B   -Calf squeeze: Negative B    PASSIVE ACCESSORY MOTION:   -Talocrural mobility: Improved mobility on Rt side   -Subtalar mobility: improved mobility B   - Midtarsal mobility: WFL   -Tarsometatarsal mobility: WFL   -metatarsophalangeal(MTP) mobility: improved mobility on Rt first metatarsal      PALPATION Date: 11/2/2021   TTP -Lateral aspect of Rt calcaneous (improved)   TONE WFL        NEUROLOGICAL SCREEN: Assessed @ Initial Visit    -RADIATING SYMPTOMS: NO     -DERMATOMES:Normal and equal B      FUNCTIONAL MOBILITY:  Assessed @ Initial Visit:    -Affecting participation in basic ADLs and functional tasks. -Improved tolerance of walking and standing   -Ambulation/Gait: Patient still ambulates on toes versus having a heel strike but when corrected she is able to without pain, just minor discomfort.   -Bed mobility: WFL   -Stairs: improved mobility   -Transfers: WFL   -Wheelchair: N/A     Body Structures Involved:  1. Bones  2. Joints  3. Muscles  4. Ligaments Body Functions Affected:  1. Sensory/Pain  2. Neuromusculoskeletal  3. Movement Related Activities and Participation Affected:  1. Mobility  2. Self Care  3. Domestic Life  4. Interpersonal Interactions and Relationships  5. Community, Social and [place]   Number of elements that affect the Plan of Care: 4+: HIGH COMPLEXITY   CLINICAL PRESENTATION:   Presentation: Evolving clinical presentation with changing clinical characteristics: MODERATE COMPLEXITY   CLINICAL DECISION MAKING:   TOOL USED:   -FOOT AND ANKLE ABILITY MEASURE (FAAM)  Score:  Initial: 52/84 Most Recent: 58/84 (Date: 11/2/2021)   Interpretation of Score: For the \"Activities of Daily Living\", there are 21 questions each scored on a 5 point scale with 0 representing \"Unable to do\" and 4 representing \"No difficulty\". The lower the score, the greater the functional disability. 84/84 represents no disability. Minimal detectable change is 5.7 points.   With the addition of the 8 questions in the \"Sports Subscale,\" there are 29 questions, each scored on a 5 point scale with 0 representing \"Unable to do\" and 4 representing \"No difficulty\". The lower the score, the greater the functional disability. 116/116 represents no disability. Minimal detectable change is 12.3 points. MEDICAL NECESSITY:  · Skilled intervention continues to be required due to above deficits affecting participation in basic ADLs and overall functional tolerance. REASON FOR SERVICES/ OTHER COMMENTS:  · Patient continues to require skilled intervention due to  above deficits affecting participation in basic ADLs and overall functional tolerance.      Use of outcome tool(s) and clinical judgement create a POC that gives a: Questionable prediction of patient's progress: MODERATE COMPLEXITY        TOTAL TREATMENT DURATION:  PT Patient Time In/Time Out  Time In: 0930  Time Out: [address], SPT Azathioprine Counseling:  I discussed with the patient the risks of azathioprine including but not limited to myelosuppression, immunosuppression, hepatotoxicity, lymphoma, and infections.  The patient understands that monitoring is required including baseline LFTs, Creatinine, possible TPMP genotyping and weekly CBCs for the first month and then every 2 weeks thereafter.  The patient verbalized understanding of the proper use and possible adverse effects of azathioprine.  All of the patient's questions and concerns were addressed.

## 2021-11-04 ENCOUNTER — HOSPITAL ENCOUNTER (OUTPATIENT)
Dept: PHYSICAL THERAPY | Age: 71
Discharge: HOME | End: 2021-11-04
Payer: MEDICARE

## 2021-11-04 PROCEDURE — 97140 MANUAL THERAPY 1/> REGIONS: CPT

## 2021-11-04 PROCEDURE — 97110 THERAPEUTIC EXERCISES: CPT

## 2021-11-04 NOTE — PROGRESS NOTES
Kourtney Gamble  : 1950  [place]  [place] [address] at [place] [address], [facility]  [phone]   [phone]      OUTPATIENT PHYSICAL THERAPY: Daily Treatment Note 2021  Visit Count:  11          TREATMENT PLAN:  Effective Dates: 2021 TO 2021 (90 days). Frequency/Duration: 2 times a week for 90 Days    ICD-10: Treatment Diagnosis:   Difficulty in walking, not elsewhere classified (R26.2)  Pain in right ankle and joints of right foot (M25.571)  Stiffness of right ankle, not elsewhere classified (M25.671) Future Appointments   Date Time Provider Brad Izaguirre   2021 10:30 AM Wayne Merlin, PT, DPT [facility]   2021  7:00 PM Wayne Merlin, PT, DPT [facility]   2021 10:30 AM Wayne Merlin, PT, DPT [facility]   2021  9:30 AM Rashida Merlin, PT, DPT [facility]   2021 10:30 AM Rashida Merlin, PT, DPT [facility]   2021 10:30 AM Wayne Merlin, PT, DPT [facility]   2021  9:30 AM Rashida Merlin, PT, DPT [facility]   2021  9:30 AM Wayne Merlin, PT, DPT [facility]   2021  9:30 AM Rashida Merlin, PT, DPT [facility]   2021  9:30 AM Rashida Merlin, PT, DPT [facility]   2021  9:30 AM Wayne Merlin, PT, DPT [facility]   2021  9:30 AM Wayne Merlin, PT, DPT [facility]          PRE-TREATMENT SYMPTOMS/COMPLAINTS:  Patient reports yesterday was really painful even though she wore the boot out of the house. Patient reports today she feels better but she still \"knows it's there. \" Patient reports its hard for her to walk \"heel to toe\" because of the pain.       MEDICATIONS REVIEWED:  2021   TREATMENT:   (In addition to Assessment/Re-Assessment sessions the following treatments were rendered)    THERAPEUTIC EXERCISE: (38 minutes):  Exercises per grid below to improve mobility, strength and balance. Required minimal visual and verbal cues to promote proper body alignment and promote proper body posture. Progressed resistance, range and complexity of movement as indicated.      Date:  10/19/2021 Date:  10/21/2021 Date:  11/02/2021 Date:  11/4/2021   Activity/Exercise Parameters Parameters Parameters Parameters      PN measurements    Scifit bike 5min  Level 4 5min   Level 4 8 min   Level 4 8 min   Level 4   Towel scrunches       Alternating toe extension       Half kneeling forward leans for DF       Heel toe raises standing       HEP review       Lateral step up       Heel raises off step with heel off step       Balance and reach forward       Squat heel raises       SL squat heel raises       Hip abduction       Rocker board       Resisted ankle DF/PF/IV/EV       Alternating toe raises       Balance on Airex        Michelle step overs       Calf stretch       Weight shifts on airex       SLS on airex   6x10 B    Balance on step/airex  [id number] SLS  3x20s tandem with UE holding ball [id number] SLS  3x20s tandem with UE holding ball    Lateral walking       Inversion eversion SL stance       Towel  with toes       Rocker board       Posterior pelvic tilt  2x10  10s hold  2x10  10s hold   Post pelvic tilt with march  5x3 each leg  5x3 each leg   Dead bug       education 10min  Education on boot wear/management      glut sets 3x10 1x10  1x10   bridge 2x10 2x10  2x10  2x10 with abduction blue band   Supine clamshells 3x10 B with isometric hold   3x10 B with isometric hold   Bridge with clamshell 2x10  5 hold with 3 abducted movements   2x10  5 hold with 3 abducted movements   Gait training 5min in cam boot  5min 5min   Bridge with ball holds  2x10     Sideline clamshells  2x10 B  2x10 B  Blue band   Sideline abduction  1x10 B     TKE  3x10 Rt  5s hold                            HEP:  [email or web address] Portal  Access Code: [email or web address]  URL: [email or web address]  Date: 09/24/2021  Prepared by: Janifer Smart    Exercises  Towel Scrunches - 2 x daily - 7 x weekly - 2 sets - 10 reps  Seated Figure 4 Piriformis Stretch - 2 x daily - 7 x weekly - 2 sets - 10 reps  Seated Heel Toe Raises - 2 x daily - 7 x weekly - 2 sets - 10 reps  Access Code: [email or web address]  URL: [email or web address]  Date: 09/30/2021  Prepared by: Janifer Smart    Exercises  Long Sitting Ankle Inversion with Resistance - 2 x daily - 7 x weekly - 2 sets - 10 reps  Long Sitting Ankle Plantar Flexion with Resistance - 2 x daily - 7 x weekly - 2 sets - 10 reps  Long Sitting Ankle Eversion with Resistance - 2 x daily - 7 x weekly - 2 sets - 10 reps  Long Sitting Ankle Dorsiflexion with Anchored Resistance - 2 x daily - 7 x weekly - 2 sets - 10 reps  Eccentric Heel Lowering on Step - 2 x daily - 7 x weekly - 2 sets - 10 reps  Standing Heel Raise - 2 x daily - 7 x weekly - 2 sets - 10 reps  Heel Toe Raises with Counter Support - 2 x daily - 7 x weekly - 2 sets - 10 reps    Access Code: [email or web address]  URL: [email or web address]  Date: 10/15/2021  Prepared by: Janifer Smart    Exercises  Supine Posterior Pelvic Tilt - 2 x daily - 7 x weekly - 10 reps - 2 sets - 10s hold  Supine March with Posterior Pelvic Tilt - 2 x daily - 7 x weekly - 2 sets - 10 reps  Dead Bug - 2 x daily - 7 x weekly - 2 sets - 10 reps      MANUAL THERAPY: (15 minutes): Joint mobilization, Soft tissue mobilization and Manipulation was utilized and necessary because of the patient's restricted joint motion, painful spasm, loss of articular motion and restricted motion of soft tissue.      Patient prone:   -STM with hands on plantar aspect of Rt foot, achilles, plantar fascia, heel cup  -STM with hands and guasha on calf      Patient supine: [NOT DONE 11/2/2021]  -traction for Rt great toe FL and Ex  -STM with hands on plantar aspect of Rt foot, achilles, plantar fascia, heel cup  -Talocrural mobilization to increase DF with dysem  -Subtalar mobilization to increase IV/EV with dysem  -fat pad mobility with dysem       (Used abbreviations: MET - muscle energy technique; PNF - proprioceptive neuromuscular facilitation; NMR - neuromuscular re-education; AP - anterior to posterior; PA - posterior to anterior)    MODALITIES: (0 minutes):      None today       TREATMENT/SESSION ASSESSMENT:  Nba Workman tolerated being out of the boot during treatment today. Patient demonstrated antalgic gait when instructed to ambulate with proper heel strike but reported no pain when asked. Patient did not appear as tight in gastroc/achilles tendon during manual.     RECOMMENDATIONS/INTENT FOR NEXT TREATMENT SESSION: \"Next visit will focus on advancements to more challenging activities\".     PAIN: Initial: 1/10 Post Session:  0/10     Total Treatment Billable Duration: 53 minutes  PT Patient Time In/Time Out  Time In: 0930  Time Out: [address], [facility]

## 2021-11-12 ENCOUNTER — HOSPITAL ENCOUNTER (OUTPATIENT)
Dept: PHYSICAL THERAPY | Age: 71
Discharge: HOME | End: 2021-11-12
Payer: MEDICARE

## 2021-11-12 NOTE — PROGRESS NOTES
Adama Contreras  : 1950  [place]  [place] [address] at [address]  [phone]   [phone]        OUTPATIENT DAILY NOTE    NAME/AGE/GENDER: Adama Contreras is a 80 y.o. female. DATE: 2021    SUBJECTIVE:  Patient cancelled appointment for today on a previous date.      Constance Romario, PT, DPT, COMT

## 2021-11-16 ENCOUNTER — HOSPITAL ENCOUNTER (OUTPATIENT)
Dept: PHYSICAL THERAPY | Age: 71
Discharge: HOME | End: 2021-11-16
Payer: MEDICARE

## 2021-11-16 PROCEDURE — 97140 MANUAL THERAPY 1/> REGIONS: CPT

## 2021-11-16 PROCEDURE — 97110 THERAPEUTIC EXERCISES: CPT

## 2021-11-16 NOTE — PROGRESS NOTES
Lisa Guillory  : 1950  [place]  [place]: [place] [address] at [facility], [address]  [phone]   [phone]      OUTPATIENT PHYSICAL THERAPY: Daily Treatment Note 2021  Visit Count:  12          TREATMENT PLAN:  Effective Dates: 2021 TO 2021 (90 days). Frequency/Duration: 2 times a week for 90 Days    ICD-10: Treatment Diagnosis:   Difficulty in walking, not elsewhere classified (R26.2)  Pain in right ankle and joints of right foot (M25.571)  Stiffness of right ankle, not elsewhere classified (M25.671) Future Appointments   Date Time Provider Brad Izaguirre   2021  9:30 AM Prosper Roel, PT, DPT [facility]   2021 10:30 AM Prosper Roel, PT, DPT [facility]   2021 10:30 AM Prosper Roel, PT, DPT [facility]   2021  9:30 AM Prosper Roel, PT, DPT [facility]   2021  9:30 AM Prosper Roel, PT, DPT [facility]   2021  9:30 AM Shaggy Roel, PT, DPT [facility]   2021  9:30 AM Shaggy Roel, PT, DPT [facility]   2021  9:30 AM Shaggy Roel, PT, DPT [facility]   2021  9:30 AM Prosper Roel, PT, DPT [facility]          PRE-TREATMENT SYMPTOMS/COMPLAINTS:  Patient notes she did a lot of walking around the mall and grocery store which started bringing on her heel pain. Notes she put her boot on immediately which helped reduce the pain and allowed her to do more. Notes she is able to ambulate well with/out pain when she is in her OOfa sandals. Is thinking about getting custom footbed for sneakers. MEDICATIONS REVIEWED:  2021   TREATMENT:   (In addition to Assessment/Re-Assessment sessions the following treatments were rendered)    THERAPEUTIC EXERCISE: (38 minutes):  Exercises per grid below to improve mobility, strength and balance.   Required minimal visual and verbal cues to promote proper body alignment and promote proper body posture. Progressed resistance, range and complexity of movement as indicated.      Date:  11/02/2021 Date:  11/4/2021 Date:  11/8/2021 Date:  11/16/2021   Activity/Exercise Parameters Parameters Parameters     PN measurements      Scifit bike 8 min   Level 4 8 min   Level 4 8 min   Level 4 8min  Level 4   Gastroc stretch    3x1min   Shuttle squats     2x10 reps   8 bungees  eccentric   Shuttle SL squat    2x10 reps B  6 bungees   Heel raises 3-way off board eccentric    2x10 reps ea     Resisted ankle PF    2x10 reps  With manual pin and stretch to achilles   Towel scrunches       Alternating toe extension       Half kneeling forward leans for DF       Heel toe raises standing       HEP review       Lateral step up       Heel raises off step with heel off step       Balance and reach forward       Squat heel raises       SL squat heel raises       Hip abduction       Rocker board       Resisted ankle DF/PF/IV/EV       Alternating toe raises       Balance on Airex    [id number] of pallof holds with red ball  [id number] of pallof holds with rotation with red ball  3x96yfy of pallof holds with vertical movement with red ball    Michelle step overs       Calf stretch       Weight shifts on airex       SLS on airex 6x10 B      Balance on step/airex [id number] SLS  3x20s tandem with UE holding ball      Lateral walking       Inversion eversion SL stance       Towel  with toes       Rocker board       Posterior pelvic tilt  2x10  10s hold     Post pelvic tilt with march  5x3 each leg     Dead bug       education       glut sets  1x10     bridge  2x10  2x10 with abduction blue band     Supine clamshells  3x10 B with isometric hold     Bridge with clamshell  2x10  5 hold with 3 abducted movements     Gait training 5min 5min 5min    Bridge with ball holds   3x10   10 sec holds  Red weighted ball    Sideline clamshells  2x10 B  Blue band Sideline abduction       TKE       Bridge on green physioball   3x10  5sec holds    Bridge with march on green physioball   3x10  5sec holds    Hamstring curl on green physioball   3x10  5sec holds    SL Hamstring curl on green physioball   3x10  5sec holds    physioball assisted squats   2x10    stairs   2 flights    DF stretch   [id number] B  With foot on step      HEP:  [facility]  Access Code: [email or web address]  URL: [email or web address]  Date: 09/24/2021  Prepared by: Nallely Baldwin    Exercises  Towel Scrunches - 2 x daily - 7 x weekly - 2 sets - 10 reps  Seated Figure 4 Piriformis Stretch - 2 x daily - 7 x weekly - 2 sets - 10 reps  Seated Heel Toe Raises - 2 x daily - 7 x weekly - 2 sets - 10 reps  Access Code: [email or web address]  URL: [email or web address]  Date: 09/30/2021  Prepared by: Nallely Baldwin    Exercises  Long Sitting Ankle Inversion with Resistance - 2 x daily - 7 x weekly - 2 sets - 10 reps  Long Sitting Ankle Plantar Flexion with Resistance - 2 x daily - 7 x weekly - 2 sets - 10 reps  Long Sitting Ankle Eversion with Resistance - 2 x daily - 7 x weekly - 2 sets - 10 reps  Long Sitting Ankle Dorsiflexion with Anchored Resistance - 2 x daily - 7 x weekly - 2 sets - 10 reps  Eccentric Heel Lowering on Step - 2 x daily - 7 x weekly - 2 sets - 10 reps  Standing Heel Raise - 2 x daily - 7 x weekly - 2 sets - 10 reps  Heel Toe Raises with Counter Support - 2 x daily - 7 x weekly - 2 sets - 10 reps    Access Code: [email or web address]  URL: [email or web address]  Date: 10/15/2021  Prepared by: Nallely Baldwin    Exercises  Supine Posterior Pelvic Tilt - 2 x daily - 7 x weekly - 10 reps - 2 sets - 10s hold  Supine March with Posterior Pelvic Tilt - 2 x daily - 7 x weekly - 2 sets - 10 reps  Dead Bug - 2 x daily - 7 x weekly - 2 sets - 10 reps      MANUAL THERAPY: (15 minutes): Joint mobilization, Soft tissue mobilization and Manipulation was utilized and necessary because of the patient's restricted joint motion, painful spasm, loss of articular motion and restricted motion of soft tissue. Patient prone:  -STM with hands on plantar aspect of Rt foot, achilles, plantar fascia, heel cup-NOT TODAY 11/16/2021  -STM with hands and guasha on calf      Patient supine:   -Talocrural mobilization to increase DF with dysem  -Subtalar mobilization to increase IV/EV with dysem  -fat pad mobility with dysem       (Used abbreviations: MET - muscle energy technique; PNF - proprioceptive neuromuscular facilitation; NMR - neuromuscular re-education; AP - anterior to posterior; PA - posterior to anterior)    MODALITIES: (0 minutes):      None today       TREATMENT/SESSION ASSESSMENT:  Wash Jade with complaints of slight heel pulling during B shuttle, no reports of pulling or pain during single shuttle. Achyness noted during heel raise 3-way of of board, demonstrated good eccentric control with all positions. Good subtalar and DF mobility during manual today. Achilles with increased tissue extensibility and movement following pin and stretch incor active ankle PF.     RECOMMENDATIONS/INTENT FOR NEXT TREATMENT SESSION: \"Next visit will focus on advancements to more challenging activities\".     PAIN: Initial: 0/10 Post Session:  1/10     Total Treatment Billable Duration: 53 minutes  PT Patient Time In/Time Out  Time In: 1030  Time Out: 1130  Rosemary Herrera, PT, DPT

## 2021-11-19 ENCOUNTER — APPOINTMENT (OUTPATIENT)
Dept: PHYSICAL THERAPY | Age: 71
End: 2021-11-19
Payer: MEDICARE

## 2021-11-19 ENCOUNTER — HOSPITAL ENCOUNTER (OUTPATIENT)
Dept: PHYSICAL THERAPY | Age: 71
Discharge: HOME | End: 2021-11-19
Payer: MEDICARE

## 2021-11-19 PROCEDURE — 97140 MANUAL THERAPY 1/> REGIONS: CPT

## 2021-11-19 PROCEDURE — 97110 THERAPEUTIC EXERCISES: CPT

## 2021-11-19 NOTE — PROGRESS NOTES
Sudha Sesay  : 1950  [place]  [place]: [place] [address] at [facility], [address]  [phone]   [phone]      OUTPATIENT PHYSICAL THERAPY: Daily Treatment Note 2021  Visit Count:  13          TREATMENT PLAN:  Effective Dates: 2021 TO 2021 (90 days). Frequency/Duration: 2 times a week for 90 Days    ICD-10: Treatment Diagnosis:   Difficulty in walking, not elsewhere classified (R26.2)  Pain in right ankle and joints of right foot (M25.571)  Stiffness of right ankle, not elsewhere classified (M25.671) Future Appointments   Date Time Provider Brad Izaguirre   2021 10:30 AM Inetta Pick, PT, DPT [facility]   2021  9:30 AM Inetta Pick, PT, DPT [facility]   2021  9:30 AM Inetta Pick, PT, DPT [facility]   2021  9:30 AM Inetta Pick, PT, DPT [facility]          PRE-TREATMENT SYMPTOMS/COMPLAINTS:  Patient notes she continues to have pain in that one spot along lateral heel. States she is only able to ambulate with her oofas pain free. MEDICATIONS REVIEWED:  2021   TREATMENT:   (In addition to Assessment/Re-Assessment sessions the following treatments were rendered)    THERAPEUTIC EXERCISE: (25 minutes):  Exercises per grid below to improve mobility, strength and balance. Required minimal visual and verbal cues to promote proper body alignment and promote proper body posture. Progressed resistance, range and complexity of movement as indicated.      Date:  2021 Date:  2021 Date:  2021 Date:  2021 Date:  2021   Activity/Exercise Parameters Parameters Parameters      PN measurements       Scifit bike 8 min   Level 4 8 min   Level 4 8 min   Level 4 8min  Level 4 8min  Level 4   Gastroc stretch    3x1min 3x1min   Shuttle squats     2x10 reps   8 bungees  eccentric 2x10 reps   8 bungees  eccentric   Shuttle SL squat    2x10 reps B  6 bungees    Heel raises 3-way    2x10 reps ea  Standing on board   2x10 reps ea  On shuttle   Resisted ankle PF    2x10 reps  With manual pin and stretch to achilles 2x10 reps  With manual pin and stretch to achilles   Tipping bird     2x10 reps B   Wobble board DF/PF     2x10 reps   Towel scrunches        Alternating toe extension        Half kneeling forward leans for DF        Heel toe raises standing        HEP review        Lateral step up        Heel raises off step with heel off step        Balance and reach forward        Squat heel raises        SL squat heel raises        Hip abduction        Rocker board        Resisted ankle DF/PF/IV/EV        Alternating toe raises        Balance on Airex    [id number] of pallof holds with red ball  [id number] of pallof holds with rotation with red ball  0l54mcn of pallof holds with vertical movement with red ball     Michelle step overs        Calf stretch        Weight shifts on airex        SLS on airex 6x10 B       Balance on step/airex [id number] SLS  3x20s tandem with UE holding ball       Lateral walking        Inversion eversion SL stance        Towel  with toes        Rocker board        Posterior pelvic tilt  2x10  10s hold      Post pelvic tilt with march  5x3 each leg      Dead bug        education        glut sets  1x10      bridge  2x10  2x10 with abduction blue band      Supine clamshells  3x10 B with isometric hold      Bridge with clamshell  2x10  5 hold with 3 abducted movements      Gait training 5min 5min 5min     Bridge with ball holds   3x10   10 sec holds  Red weighted ball     Sideline clamshells  2x10 B  Blue band      Sideline abduction        TKE        Bridge on green physioball   3x10  5sec holds     Bridge with march on green physioball   3x10  5sec holds     Hamstring curl on green physioball   3x10  5sec holds     SL Hamstring curl on green physioball   3x10  5sec holds     physioball assisted squats   2x10     stairs   2 flights     DF stretch   [id number] B  With foot on step       HEP:  [employer]  Access Code: [email or web address]  URL: [email or web address]  Date: 09/24/2021  Prepared by: Viki Lola    Exercises  Towel Scrunches - 2 x daily - 7 x weekly - 2 sets - 10 reps  Seated Figure 4 Piriformis Stretch - 2 x daily - 7 x weekly - 2 sets - 10 reps  Seated Heel Toe Raises - 2 x daily - 7 x weekly - 2 sets - 10 reps  Access Code: [email or web address]  URL: [email or web address]  Date: 09/30/2021  Prepared by: Viki Lola    Exercises  Long Sitting Ankle Inversion with Resistance - 2 x daily - 7 x weekly - 2 sets - 10 reps  Long Sitting Ankle Plantar Flexion with Resistance - 2 x daily - 7 x weekly - 2 sets - 10 reps  Long Sitting Ankle Eversion with Resistance - 2 x daily - 7 x weekly - 2 sets - 10 reps  Long Sitting Ankle Dorsiflexion with Anchored Resistance - 2 x daily - 7 x weekly - 2 sets - 10 reps  Eccentric Heel Lowering on Step - 2 x daily - 7 x weekly - 2 sets - 10 reps  Standing Heel Raise - 2 x daily - 7 x weekly - 2 sets - 10 reps  Heel Toe Raises with Counter Support - 2 x daily - 7 x weekly - 2 sets - 10 reps    Access Code: [email or web address]  URL: [email or web address]  Date: 10/15/2021  Prepared by: Viki Lola    Exercises  Supine Posterior Pelvic Tilt - 2 x daily - 7 x weekly - 10 reps - 2 sets - 10s hold  Supine March with Posterior Pelvic Tilt - 2 x daily - 7 x weekly - 2 sets - 10 reps  Dead Bug - 2 x daily - 7 x weekly - 2 sets - 10 reps      MANUAL THERAPY: (28 minutes): Joint mobilization, Soft tissue mobilization and Manipulation was utilized and necessary because of the patient's restricted joint motion, painful spasm, loss of articular motion and restricted motion of soft tissue.      Patient prone:  -STM with hands on plantar aspect of Rt foot, achilles, plantar fascia, heel cup  -STM with hands and guasha on Rt calf  -Luko tape to Rt heel cup      Patient supine: -NOT TODAY 11/19/2021  -Talocrural mobilization to increase DF with dysem  -Subtalar mobilization to increase IV/EV with dysem  -fat pad mobility with dysem       (Used abbreviations: MET - muscle energy technique; PNF - proprioceptive neuromuscular facilitation; NMR - neuromuscular re-education; AP - anterior to posterior; PA - posterior to anterior)    MODALITIES: (0 minutes):      None today       TREATMENT/SESSION ASSESSMENT:  Akhil Corpus with complaints of slight heel pulling during the last reps of tipping bird exercises. Did luko tape to heel cup to see if the added cushion and support will help reduce pain during ambulation. Instructed pt to try ambulating around block with her oofa's since they are the only shoes that do not cause her pain at this time; and if there is no pain to see if they have a sneaker. Discussed contacting PCP for xray of heel. RECOMMENDATIONS/INTENT FOR NEXT TREATMENT SESSION: \"Next visit will focus on advancements to more challenging activities\".     PAIN: Initial: 0/10 Post Session:  1/10     Total Treatment Billable Duration: 53 minutes  PT Patient Time In/Time Out  Time In: 7840  Time Out: 1040  Norma Fall, PT, DPT

## 2021-11-22 ENCOUNTER — APPOINTMENT (OUTPATIENT)
Dept: PHYSICAL THERAPY | Age: 71
End: 2021-11-22
Payer: MEDICARE

## 2021-11-24 ENCOUNTER — HOSPITAL ENCOUNTER (OUTPATIENT)
Dept: PHYSICAL THERAPY | Age: 71
Discharge: HOME | End: 2021-11-24
Payer: MEDICARE

## 2021-11-24 PROCEDURE — 97110 THERAPEUTIC EXERCISES: CPT

## 2021-11-24 PROCEDURE — 97140 MANUAL THERAPY 1/> REGIONS: CPT

## 2021-11-24 NOTE — PROGRESS NOTES
I am accessing Ms. Grande's chart as a part of our department's internal chart auditing process. I certify that Ms. Bita Bentley is, or was, a patient in our department.   Thank you,  Aaron James, PT  11/24/2021

## 2021-11-24 NOTE — PROGRESS NOTES
Kitty Emmanuel  : 1950  [place]  Secondary: [facility] at [place] [address], [address] [facility]  [phone]   [phone]      OUTPATIENT PHYSICAL THERAPY: Daily Treatment Note 2021  Visit Count:  14          TREATMENT PLAN:  Effective Dates: 2021 TO 2021 (90 days). Frequency/Duration: 2 times a week for 90 Days    ICD-10: Treatment Diagnosis:   Difficulty in walking, not elsewhere classified (R26.2)  Pain in right ankle and joints of right foot (M25.571)  Stiffness of right ankle, not elsewhere classified (M25.671) Future Appointments   Date Time Provider Brad Izaguirre   2021  9:30 AM Tameka Dodson, PT, DPT [facility]   2021  9:30 AM Tameka Dodson, PT, DPT [facility]   2021  9:30 AM Tameka Dodson, PT, DPT [facility]          PRE-TREATMENT SYMPTOMS/COMPLAINTS:  Patient notes she continues to have pain in that one spot along lateral heel. States she is only able to ambulate with her oofas pain free. Denies any changes at this time. Notes she will be calling her PCP next week for referral to Ortho for xray. MEDICATIONS REVIEWED:  2021   TREATMENT:   (In addition to Assessment/Re-Assessment sessions the following treatments were rendered)    THERAPEUTIC EXERCISE: (38 minutes):  Exercises per grid below to improve mobility, strength and balance. Required minimal visual and verbal cues to promote proper body alignment and promote proper body posture. Progressed resistance, range and complexity of movement as indicated.      Date:  2021 Date:  2021 Date:  2021   Activity/Exercise            Scifit bike 8min  Level 4 8min  Level 4 8min  Level 4.5   Gastroc stretch 3x1min 3x1min 3x1min   Shuttle squats  2x10 reps   8 bungees  eccentric 2x10 reps   8 bungees  eccentric 2x10 reps   8 bungees  Eccentric  Power   Shuttle SL squat 2x10 reps B  6 bungees     Heel raises 3-way 2x10 reps ea  Standing on board   2x10 reps ea  On shuttle 1x10 reps ea  On shuttle   Toe walking   2x60ft   Monster walks with resistance around foot   4x60ft  Red TB   Lateral walking squats with elbow extension holding weighted ball   2x60ft  Yellow weighted ball   Tandem balance   3x30s B  Blue airex   Resisted ankle PF 2x10 reps  With manual pin and stretch to achilles 2x10 reps  With manual pin and stretch to achilles    Tipping bird  2x10 reps B    Wobble board DF/PF  2x10 reps      HEP:  [employer]  Access Code: [email or web address]  URL: [email or web address]  Date: 09/24/2021  Prepared by: Jing Garre    Exercises  Towel Scrunches - 2 x daily - 7 x weekly - 2 sets - 10 reps  Seated Figure 4 Piriformis Stretch - 2 x daily - 7 x weekly - 2 sets - 10 reps  Seated Heel Toe Raises - 2 x daily - 7 x weekly - 2 sets - 10 reps  Access Code: [email or web address]  URL: [email or web address]  Date: 09/30/2021  Prepared by: Jing Garre    Exercises  Long Sitting Ankle Inversion with Resistance - 2 x daily - 7 x weekly - 2 sets - 10 reps  Long Sitting Ankle Plantar Flexion with Resistance - 2 x daily - 7 x weekly - 2 sets - 10 reps  Long Sitting Ankle Eversion with Resistance - 2 x daily - 7 x weekly - 2 sets - 10 reps  Long Sitting Ankle Dorsiflexion with Anchored Resistance - 2 x daily - 7 x weekly - 2 sets - 10 reps  Eccentric Heel Lowering on Step - 2 x daily - 7 x weekly - 2 sets - 10 reps  Standing Heel Raise - 2 x daily - 7 x weekly - 2 sets - 10 reps  Heel Toe Raises with Counter Support - 2 x daily - 7 x weekly - 2 sets - 10 reps    Access Code: [email or web address]  URL: [email or web address]  Date: 10/15/2021  Prepared by: Jing Garre    Exercises  Supine Posterior Pelvic Tilt - 2 x daily - 7 x weekly - 10 reps - 2 sets - 10s hold  Supine March with Posterior Pelvic Tilt - 2 x daily - 7 x weekly - 2 sets - 10 reps  Dead Bug - 2 x daily - 7 x weekly - 2 sets - 10 reps      MANUAL THERAPY: (15 minutes): Joint mobilization, Soft tissue mobilization and Manipulation was utilized and necessary because of the patient's restricted joint motion, painful spasm, loss of articular motion and restricted motion of soft tissue. Patient prone:  -STM with hands on plantar aspect of Rt foot, achilles, plantar fascia, heel cup  -STM with hands and guasha on Rt calf  -Pn/stretch Rt gastroc with active ankle DF 2x10 reps      Patient supine: -NOT TODAY 11/24/2021  -Talocrural mobilization to increase DF with dysem  -Subtalar mobilization to increase IV/EV with dysem  -fat pad mobility with dysem       (Used abbreviations: MET - muscle energy technique; PNF - proprioceptive neuromuscular facilitation; NMR - neuromuscular re-education; AP - anterior to posterior; PA - posterior to anterior)    MODALITIES: (0 minutes):      None today       TREATMENT/SESSION ASSESSMENT:  Easter Mulling with tenderness over lateral gastroc head which subsided following pin/stretch with active ankle DF. Tolerated shuttle squats well and reported it did not feel very difficult at the highest setting which indicates increased tolerance and LE strength. No complaints of heel pain during lateral movements. RECOMMENDATIONS/INTENT FOR NEXT TREATMENT SESSION: \"Next visit will focus on advancements to more challenging activities\".     PAIN: Initial: 0/10 Post Session:  1/10     Total Treatment Billable Duration: 53 minutes  PT Patient Time In/Time Out  Time In: 1030  Time Out: 1130  Za Torres, PT, DPT

## 2021-11-30 ENCOUNTER — APPOINTMENT (OUTPATIENT)
Dept: PHYSICAL THERAPY | Age: 71
End: 2021-11-30
Payer: MEDICARE

## 2021-12-01 ENCOUNTER — HOSPITAL ENCOUNTER (OUTPATIENT)
Dept: PHYSICAL THERAPY | Age: 71
Discharge: HOME | End: 2021-12-01
Payer: MEDICARE

## 2021-12-01 PROCEDURE — 97110 THERAPEUTIC EXERCISES: CPT

## 2021-12-01 PROCEDURE — 97140 MANUAL THERAPY 1/> REGIONS: CPT

## 2021-12-01 NOTE — PROGRESS NOTES
Cam Thapa  : 1950  [place]  [place] [address] at [facility], [address]  [phone]   [phone]      OUTPATIENT PHYSICAL THERAPY: Daily Treatment Note 2021  Visit Count:  15          TREATMENT PLAN:  Effective Dates: 2021 TO 2021 (90 days). Frequency/Duration: 2 times a week for 90 Days    ICD-10: Treatment Diagnosis:   Difficulty in walking, not elsewhere classified (R26.2)  Pain in right ankle and joints of right foot (M25.571)  Stiffness of right ankle, not elsewhere classified (F00.585) Future Appointments   Date Time Provider Brad Izaguirre   2021  9:30 AM Chris Neff, PT, DPT [facility]   2021  9:30 AM Chris Neff PT, DPT [facility]          PRE-TREATMENT SYMPTOMS/COMPLAINTS:  Patient notes she continues to have pain in that one spot along lateral heel. No change since last visit. States it is worse at the end of the day. Notes she contacted PCP for referral to ortho. MEDICATIONS REVIEWED:  2021   TREATMENT:   (In addition to Assessment/Re-Assessment sessions the following treatments were rendered)    THERAPEUTIC EXERCISE: (30 minutes):  Exercises per grid below to improve mobility, strength and balance. Required minimal visual and verbal cues to promote proper body alignment and promote proper body posture. Progressed resistance, range and complexity of movement as indicated.      Date:  2021 Date:  2021 Date:  2021 Date:  2021   Activity/Exercise              Scifit bike 8min  Level 4 8min  Level 4 8min  Level 4.5 8min  Level 4.5   Gastroc stretch 3x1min 3x1min 3x1min 3x1min   Shuttle squats  2x10 reps   8 bungees  eccentric 2x10 reps   8 bungees  eccentric 2x10 reps   8 bungees  Eccentric  Power    Shuttle SL squat 2x10 reps B  6 bungees      Heel raises 3-way 2x10 reps ea  Standing on board 2x10 reps ea  On shuttle 1x10 reps ea  On shuttle 2x10 reps ea   Toe walking   2x60ft 2x60ft   Monster walks with resistance around foot   4x60ft  Red TB    Lateral walking squats with elbow extension holding weighted ball   2x60ft  Yellow weighted ball    Tandem balance   3x30s B  Blue airex 3x30s B  Blue airex   Marching    2x10 reps  Blue airex  Hold 2s   Resisted ankle PF 2x10 reps  With manual pin and stretch to achilles 2x10 reps  With manual pin and stretch to achilles     Tipping bird  2x10 reps B     Wobble board DF/PF  2x10 reps  2x10 reps     HEP:  [employer]  Access Code: [email or web address]  URL: [email or web address]  Date: 09/24/2021  Prepared by: Ivania Sapp    Exercises  Towel Scrunches - 2 x daily - 7 x weekly - 2 sets - 10 reps  Seated Figure 4 Piriformis Stretch - 2 x daily - 7 x weekly - 2 sets - 10 reps  Seated Heel Toe Raises - 2 x daily - 7 x weekly - 2 sets - 10 reps  Access Code: [email or web address]  URL: [email or web address]  Date: 09/30/2021  Prepared by: Ivania Arbkeny    Exercises  Long Sitting Ankle Inversion with Resistance - 2 x daily - 7 x weekly - 2 sets - 10 reps  Long Sitting Ankle Plantar Flexion with Resistance - 2 x daily - 7 x weekly - 2 sets - 10 reps  Long Sitting Ankle Eversion with Resistance - 2 x daily - 7 x weekly - 2 sets - 10 reps  Long Sitting Ankle Dorsiflexion with Anchored Resistance - 2 x daily - 7 x weekly - 2 sets - 10 reps  Eccentric Heel Lowering on Step - 2 x daily - 7 x weekly - 2 sets - 10 reps  Standing Heel Raise - 2 x daily - 7 x weekly - 2 sets - 10 reps  Heel Toe Raises with Counter Support - 2 x daily - 7 x weekly - 2 sets - 10 reps    Access Code: [email or web address]  URL: [email or web address]  Date: 10/15/2021  Prepared by: Salli Arbour    Exercises  Supine Posterior Pelvic Tilt - 2 x daily - 7 x weekly - 10 reps - 2 sets - 10s hold  Supine March with Posterior Pelvic Tilt - 2 x daily - 7 x weekly - 2 sets - 10 reps  Dead Bug - 2 x daily - 7 x weekly - 2 sets - 10 reps      MANUAL THERAPY: (23 minutes): Joint mobilization, Soft tissue mobilization and Manipulation was utilized and necessary because of the patient's restricted joint motion, painful spasm, loss of articular motion and restricted motion of soft tissue. *Pt Lt sidelying for:   -DN to lateral aspect of Rt fat pad and mm belly   -Ice massage to Rt lateral heel    Patient prone:-NOT TODAY 12/1/2021  -STM with hands on plantar aspect of Rt foot, achilles, plantar fascia, heel cup  -STM with hands and guasha on Rt calf  -Pn/stretch Rt gastroc with active ankle DF 2x10 reps      Patient supine: -NOT TODAY 12/1/2021  -Talocrural mobilization to increase DF with dysem  -Subtalar mobilization to increase IV/EV with dysem  -fat pad mobility with dysem       (Used abbreviations: MET - muscle energy technique; PNF - proprioceptive neuromuscular facilitation; NMR - neuromuscular re-education; AP - anterior to posterior; PA - posterior to anterior)    DRY NEEDLING: (5 min concurrent with manual therapy): Consent signed, scanned, and filed. Location: 3 needles lateral aspect of Rt heel, 1 needle plantar surface of lateral aspect of heel  Needles used: .30x. 30  Purpose: Reduce muscle tone and improve functional mobility. Stimulate descending pain inhibitory systems or cortical areas of the brain that are involved in pain control. MODALITIES: (0 minutes):      None today       TREATMENT/SESSION ASSESSMENT:  Dorian Oiler with tenderness during needling. Able to put full weight on heel after treatment session while in shoe. RECOMMENDATIONS/INTENT FOR NEXT TREATMENT SESSION: \"Next visit will focus on advancements to more challenging activities\".     PAIN: Initial: 1/10 Post Session:  1/10     Total Treatment Billable Duration: 53 minutes  PT Patient Time In/Time Out  Time In: 0930  Time Out: 1030  Teja Lawrence, PT, DPT

## 2021-12-07 ENCOUNTER — HOSPITAL ENCOUNTER (OUTPATIENT)
Dept: PHYSICAL THERAPY | Age: 71
Discharge: HOME | End: 2021-12-07
Payer: MEDICARE

## 2021-12-07 PROCEDURE — 97140 MANUAL THERAPY 1/> REGIONS: CPT

## 2021-12-07 PROCEDURE — 97110 THERAPEUTIC EXERCISES: CPT

## 2021-12-07 NOTE — PROGRESS NOTES
Raheel Mayo  : 1950  [place]  Secondary: [facility] at [place] [address], [address] [facility]  [phone]   [phone]      OUTPATIENT PHYSICAL THERAPY: Daily Treatment Note 2021  Visit Count:  16          TREATMENT PLAN:  Effective Dates: 2021 TO 2021 (90 days). Frequency/Duration: 2 times a week for 90 Days    ICD-10: Treatment Diagnosis:   Difficulty in walking, not elsewhere classified (R26.2)  Pain in right ankle and joints of right foot (M25.571)  Stiffness of right ankle, not elsewhere classified (N87.103) Future Appointments   Date Time Provider Brad Izaguirre   2021  7:00 PM Yuniel Inks, PT, DPT [facility]   2021  3:30 PM Sydna Inks, PT, DPT [facility]          PRE-TREATMENT SYMPTOMS/COMPLAINTS:  Patient notes her heel was slightly sore after dry needling but it resolved the following day. Notes she has not been able to tell a significant difference in the heel and ability to WB on it for long periods of time. States she has order in for xray of the heel. MEDICATIONS REVIEWED:  2021   TREATMENT:   (In addition to Assessment/Re-Assessment sessions the following treatments were rendered)    THERAPEUTIC EXERCISE: (30 minutes):  Exercises per grid below to improve mobility, strength and balance. Required minimal visual and verbal cues to promote proper body alignment and promote proper body posture. Progressed resistance, range and complexity of movement as indicated.      Date:  2021 Date:  2021   Activity/Exercise          Scifit bike 8min  Level 4.5 8min  Level 4.5  upright   Gastroc stretch 3x1min    Shuttle squats      Shuttle SL squat  3x10 reps ea  Shuttle  8bungees   Heel raises 3-way 2x10 reps ea 2x10 reps ea  Shuttle  8bungees   Toe walking 2x60ft 2x60ft   Monster walks with resistance around foot     Lateral walking squats with elbow extension holding weighted ball     Tandem balance 3x30s B  Blue airex 3x30s B  Blue airex   Marching 2x10 reps  Blue airex  Hold 2s 2x10 reps  Blue airex  Lateral marching   Resisted ankle PF     Tipping bird     Wobble board DF/PF 2x10 reps 2x10 reps     HEP:  [employer]  Access Code: [email or web address]  URL: [email or web address]  Date: 09/24/2021  Prepared by: Telma Masters    Exercises  Towel Scrunches - 2 x daily - 7 x weekly - 2 sets - 10 reps  Seated Figure 4 Piriformis Stretch - 2 x daily - 7 x weekly - 2 sets - 10 reps  Seated Heel Toe Raises - 2 x daily - 7 x weekly - 2 sets - 10 reps  Access Code: [email or web address]  URL: [email or web address]  Date: 09/30/2021  Prepared by: Telma Masters    Exercises  Long Sitting Ankle Inversion with Resistance - 2 x daily - 7 x weekly - 2 sets - 10 reps  Long Sitting Ankle Plantar Flexion with Resistance - 2 x daily - 7 x weekly - 2 sets - 10 reps  Long Sitting Ankle Eversion with Resistance - 2 x daily - 7 x weekly - 2 sets - 10 reps  Long Sitting Ankle Dorsiflexion with Anchored Resistance - 2 x daily - 7 x weekly - 2 sets - 10 reps  Eccentric Heel Lowering on Step - 2 x daily - 7 x weekly - 2 sets - 10 reps  Standing Heel Raise - 2 x daily - 7 x weekly - 2 sets - 10 reps  Heel Toe Raises with Counter Support - 2 x daily - 7 x weekly - 2 sets - 10 reps    Access Code: [email or web address]  URL: [email or web address]  Date: 10/15/2021  Prepared by: Telma Masters    Exercises  Supine Posterior Pelvic Tilt - 2 x daily - 7 x weekly - 10 reps - 2 sets - 10s hold  Supine March with Posterior Pelvic Tilt - 2 x daily - 7 x weekly - 2 sets - 10 reps  Dead Bug - 2 x daily - 7 x weekly - 2 sets - 10 reps      MANUAL THERAPY: (23 minutes): Joint mobilization, Soft tissue mobilization and Manipulation was utilized and necessary because of the patient's restricted joint motion, painful spasm, loss of articular motion and restricted motion of soft tissue. Patient prone:  -STM with hands and guasha on Rt calf and achilles   -DN to lateral aspect of Rt fat pad and mm belly   -Ice massage to Rt lateral heel      Patient supine: -NOT TODAY 12/7/2021  -Talocrural mobilization to increase DF with dysem  -Subtalar mobilization to increase IV/EV with dysem  -fat pad mobility with dysem     (Used abbreviations: MET - muscle energy technique; PNF - proprioceptive neuromuscular facilitation; NMR - neuromuscular re-education; AP - anterior to posterior; PA - posterior to anterior)    DRY NEEDLING: (5 min concurrent with manual therapy): Consent signed, scanned, and filed. Location: 3 needles lateral aspect of Rt heel, 1 needle plantar surface of lateral aspect of heel  Needles used: .30x. 30  Purpose: Reduce muscle tone and improve functional mobility. Stimulate descending pain inhibitory systems or cortical areas of the brain that are involved in pain control. MODALITIES: (0 minutes):      None today       TREATMENT/SESSION ASSESSMENT:  Thuy Glatter with tenderness during needling at plantar aspect of lateral heel. Able to put full weight on heel after treatment session while in shoe. Balance is improving and able to perform with minimal involvement from UEs. No pain during toe walking. Good control during wobble board. Plan to D/C next visit. Spoke about seeing ortho specialist due to continued pain with WB.     RECOMMENDATIONS/INTENT FOR NEXT TREATMENT SESSION: \"Next visit will focus on advancements to more challenging activities\".     PAIN: Initial: 1/10 Post Session:  1/10     Total Treatment Billable Duration: 53 minutes  PT Patient Time In/Time Out  Time In: 0930  Time Out: 1030  Manuela Su, PT, DPT

## 2021-12-09 ENCOUNTER — APPOINTMENT (OUTPATIENT)
Dept: PHYSICAL THERAPY | Age: 71
End: 2021-12-09
Payer: MEDICARE

## 2021-12-14 ENCOUNTER — HOSPITAL ENCOUNTER (OUTPATIENT)
Dept: PHYSICAL THERAPY | Age: 71
Discharge: HOME | End: 2021-12-14
Payer: MEDICARE

## 2021-12-14 NOTE — PROGRESS NOTES
Asmita Ayala  : 1950  [place]  [place] [address] at [address]  [phone]   [phone]        OUTPATIENT DAILY NOTE    NAME/AGE/GENDER: Asmita Ayala is a 80 y.o. female. DATE: 2021    SUBJECTIVE:  Patient cancelled appointment for today on a previous date. Will plan to follow up on next scheduled visit.     Eleuterio Mccarty, PT, DPT, COMT

## 2021-12-16 ENCOUNTER — APPOINTMENT (OUTPATIENT)
Dept: PHYSICAL THERAPY | Age: 71
End: 2021-12-16
Payer: MEDICARE

## 2021-12-21 ENCOUNTER — HOSPITAL ENCOUNTER (OUTPATIENT)
Dept: PHYSICAL THERAPY | Age: 71
Discharge: HOME | End: 2021-12-21
Payer: MEDICARE

## 2021-12-21 PROCEDURE — 97110 THERAPEUTIC EXERCISES: CPT

## 2021-12-21 NOTE — THERAPY DISCHARGE
Thomas Pastor  : 1950  [place] / [place] / Product Type: Medicare /  [address]  [facility], [address]  [phone]   [phone]         OUTPATIENT PHYSICAL [facility] 2021    ICD-10: Treatment Diagnosis:   Difficulty in walking, not elsewhere classified (R26.2)  Pain in right ankle and joints of right foot (M25.571)  Stiffness of right ankle, not elsewhere classified (M25.671)    PRECAUTIONS/ALLERGIES:   Codeine and Mometasone furoate     FALL RISK SCORE: 1 (? 5 = High Risk)    MD ORDERS: Eval and Treat  MEDICAL/REFERRING DIAGNOSIS:  Pain of right heel (M79.671)    DATE OF ONSET: Beginning of Summer    REFERRING PHYSICIAN: Agustina Beltran MD    RETURN PHYSICIAN APPOINTMENT: TBD by patient      Ambulatory/Rehab Services H2 Model Falls Risk Assessment    Risk Factors:       No Risk Factors Identified Ability to Rise from Chair:       (1)  Pushes up, successful in one attempt    Falls Prevention Plan:       No modifications necessary   Total: (5 or greater = High Risk): 1    © [facility] of [employer]. All Rights Reserved. [facility] Patent #[id number]. Federal Law prohibits the replication, distribution or use without written permission from [facility] of [address] [place] [address]: Pt has attended 18 visits including initial evaluation as of 2021. She has shown some improvements in overall strength, balance, and AROM however continues to have difficulty putting weight through her heel with ambulation due to pain that prevents her from doing so. She had an XRAY which showed only a mild calcified spur on plantar calcaneus. She has met all goals that were set forth in previous assessment dates. Pt agreeable to discharge since she has met all goals and is now able to ambulate without pain. She notes she still experiences pain when she ambulates without support or shoes. Progress Report: Ms. Corrina Nelosn has attended 10 physical therapy session including initial evaluation as of 11/02/21. Corrina Nelson demonstrates improved Rt ankle strength, improved Rt ankle ROM, improved tolerance of ADLs, improved but still decreased functional mobility and activity tolerance. She has been in a CAM boot for 2 weeks and reports she is doing better however still notes tenderness mostly at Rt lateral heel during WB and palpation. Patient still has difficulty performing heel strike during gait and resorts to striking with the ball of her foot to reduce pain. When cued, patient is able to correct walking form but quickly resorts back to incorrect form even with minimal pain in heel. Patient has met 2/5 STG and 2/5 LTG  According to their responses on the Foot and Ankle Ability Measure, Corrina Nelson is now 69.0% limited by her ankle pain and dysfunction in their ability to participate in ADLs and their overall functional tolerance. Recommending skilled PT: manual therapeutic techniques (as appropriate), therapeutic exercises and activities, balance and comprehensive home exercises program to address current impairment. Corrina Nelson will benefit from skilled PT (medically necessary) to address above deficits affecting participation in basic ADLs and overall functional tolerance. PROBLEM LIST (Impacting functional limitations):  1. Decreased Strength  2. Decreased ADL/Functional Activities  3. Decreased Transfer Abilities  4. Decreased Ambulation Ability/Technique  5. Decreased Balance  6. Increased Pain  7. Decreased Activity Tolerance  8. Decreased [place] with Home Exercise Program INTERVENTIONS PLANNED:  1. Balance Exercise  2. Bed Mobility  3. Cold  4. Cryotherapy  5. Family Education  6. Gait Training  7. Heat  8. Home Exercise Program (HEP)  9. Manual Therapy  10. Neuromuscular Re-education/Strengthening  11. Range of Motion (ROM)  12. Therapeutic Activites  13.  Therapeutic Exercise/Strengthening  14. Transfer Training  15. Ultrasound (US)  16. Aquatic therapy  17. Electrical Stimulation  18. Vasopneumatic compression   19. Dry Needling for Pain control   TREATMENT PLAN:  Effective Dates: 9/24/2021 TO 12/23/2021 (90 days). Frequency/Duration: 2 times a week for 90 Days    SHORT-TERM FUNCTIONAL GOALS: Time Frame: 4 weeks  1. Lord Lopez will be compliant with home exercise program within 4 weeks in order to improve active participation with management of patient's symptoms and/or functional deficits. -MET as of 11/2/2021  2. Lord Lopez will report <=3/10 pain with walking >15 minutes in order to participate in daily exercise and daily activities. -MET 12/21/2021  3. Lord Lopez will be able to stand >=15 minutes with <2/10 pain to feet in order to participate in household duties without issues/compromise. -MET 12/21/2021  4. Lord Lopez  will improve Rt ankle dorsiflexion AROM from -3 to 0 (neutral) in order to show improvement in ankle ROM and tolerance for functional activity. -MET 12/21/2021  5. Lord Lopez will be report improved score on the Foot and Ankle Ability Measure from 52/84 to 58/84 to indicate improvement in functional independence. -MET as of 11/2/2021    DISCHARGE GOALS: Time Frame: 12 weeks  1. Lord Lopez will be independent with home exercise program within 8 weeks in order to improve independence with management of patient's symptoms and/or functional deficits. -MET as of 11/2/2021  2. Lord Lopez will report <=2/10 pain with participation in activities of daily living and overall functional mobility including walking and stair ambulation. -MET 12/21/2021  3. Lord Lopez will be able to stand >=30 minutes without reports of increased Rt foot/ankle pain. -MET 12/21/2021  4. Lord Lopez will be report improved score on the Foot and Ankle Ability Measure from 58/84 to 64/84 to indicate improvement in functional independence.  -MET 12/21/2021  5Janette Laguna will improve ankle strength to >=4+/5 to improve tolerance of ADLs and improve overall functional mobility. -MET as of 11/2/2021    REHABILITATION POTENTIAL FOR STATED GOALS: GOOD    Regarding Gisela Grande's therapy, I certify that the treatment plan above will be carried out by a therapist or under their direction. Thank you for this referral,  [place] Dancer, PT, DPT               HISTORY:  All history obtained on 9/24/2021 unless otherwise noted. PATIENT STATED GOAL:   Patient would like to get back to walking without pain. HISTORY OF PRESENT INJURY/ILLNESS (REASON FOR REFERRAL):   Patient reports she was doing her hair at the beginning of summer and stepped on the plug of a curling iron and went up onto the ball of her foot and it felt like stone bruise behind her big toe and heel area. Then her heel really started hurting about a month ago. She went to a football game in August and walked around a lot and it increased her pain and it has not calmed down since. Patient reports she was wearing sandals during that game and enjoys wearing chacos because of the high arch support. Patient reports when she lifts her big toe it causes the same pain on the lateral side of heel. Patient loves to walk and play with grandchildren and has not been able to do so in the last couple months. Patient reports pain increases throughout the day with increasing activity. Patient has night splint that she borrowed from friend and felt it was helping when she got up to urinate during the night because it relieves pressure. Does not walk around house barefoot due to pain. Pt reports ice helps reduce pain sometimes. Notes aleve also helps her pain. States she is having the most difficulty putting weight through her heel during any walking and standing, sit to stand. Pt states the pain can get as high as 9/10 and as low as 0/10 non weightbearing.        PAST MEDICAL HISTORY/COMORBIDITIES:  Ms. Bentley Avendaño  has a past medical history of Hyperlipidemia (10/24/2018) and Rosacea (10/24/2018). Ms. Bentley Avendaño  has no past surgical history on file. Active Ambulatory Problems     Diagnosis Date Noted    Hyperlipidemia 10/24/2018    Rosacea 10/24/2018    Hyperglycemia 10/24/2018    ERRONEOUS ENCOUNTER--DISREGARD 09/29/2019     Resolved Ambulatory Problems     Diagnosis Date Noted    No Resolved Ambulatory Problems     No Additional Past Medical History       SOCIAL HISTORY/LIVING ENVIRONMENT:   Patient lives with  at home with stairs to enter home. Patient really enjoyed walking with grandchildren. Social History     Socioeconomic History    Marital status:      Spouse name: Not on file    Number of children: Not on file    Years of education: Not on file    Highest education level: Not on file   Occupational History    Not on file   Tobacco Use    Smoking status: Never Smoker    Smokeless tobacco: Never Used   Substance and Sexual Activity    Alcohol use: Yes     Comment: occasional    Drug use: Never    Sexual activity: Not on file   Other Topics Concern    Not on file   Social History Narrative    Not on file     Social Determinants of Health     Financial Resource Strain:     Difficulty of Paying Living Expenses: Not on file   Food Insecurity:     Worried About Running Out of Food in the Last Year: Not on file    Cristela of Food in the Last Year: Not on file   Transportation Needs:     Lack of Transportation (Medical): Not on file    Lack of Transportation (Non-Medical):  Not on file   Physical Activity:     Days of Exercise per Week: Not on file    Minutes of Exercise per Session: Not on file   Stress:     Feeling of Stress : Not on file   Social Connections:     Frequency of Communication with Friends and Family: Not on file    Frequency of Social Gatherings with Friends and Family: Not on file    Attends Orthodoxy Services: Not on file   [employer] of Clubs or Organizations: Not on file    Attends Club or Organization Meetings: Not on file    Marital Status: Not on file   Intimate Partner Violence:     Fear of Current or Ex-Partner: Not on file    Emotionally Abused: Not on file    Physically Abused: Not on file    Sexually Abused: Not on file   Housing Stability:     Unable to Pay for Housing in the Last Year: Not on file    Number of [place] in the Last Year: Not on file    Unstable Housing in the Last Year: Not on file     PRIOR LEVEL OF FUNCTION/WOR/ACTIVITY:  Patient is retired but very active lifestyle through walking and playing with grandchildren. CURRENT MEDICATIONS:    Current Outpatient Medications:     metFORMIN (GLUCOPHAGE) 500 mg tablet, TAKE ONE TABLET BY MOUTH TWICE A DAY WITH MEAL(S), Disp: 180 Tab, Rfl: 3    simvastatin (ZOCOR) 40 mg tablet, Take 1 Tab by mouth nightly., Disp: 90 Tab, Rfl: 3    sulfacetamide sodium 9.8 % lotn, by Apply Externally route. 10% lotion, Disp: , Rfl:     ketoconazole (NIZORAL) 2 % shampoo, Apply  to affected area daily as needed for Itching., Disp: , Rfl:     furosemide (LASIX) 20 mg tablet, Take 1 Tab by mouth daily. , Disp: 30 Tab, Rfl: 1    Blood-Glucose Meter monitoring kit, Test daily as directed, Disp: 1 Kit, Rfl: 0    lancets misc, Test daily as directed, Disp: 1 Each, Rfl: 11    glucose blood VI test strips (ASCENSIA AUTODISC VI, ONE TOUCH ULTRA TEST VI) strip, Test daily as needed, Disp: 100 Strip, Rfl: 3     Date Last Reviewed:  12/21/2021   Number of Personal Factors/Comorbidities that affect the Plan of Care: 1-2: MODERATE COMPLEXITY   EXAMINATION:   OBSERVATION/ORTHOSTATIC POSTURAL ASSESSMENT: Assessed @ 12/21/2021   -Pt sits with forward head and rounded shoulders which indicate tight anterior chest musculature, upper trapezius, and levator scapula and weak posterior scapula musculature and deep cervical flexors.  Pt displays decreased core motor control indicating weak core and low back musculature.       BALANCE (SLS) Date:  9/24/2021    Date: 11/2/2021   Date: 12/21/2021   Right Unable to balance on RLE due to pain >10 sec with minimal discomfort in achilles 20s  No pain or discomfort   Left [facility] WFL       MEASUREMENTS:          Date:  9/24/2021  Date: 11/2/2021  Date: 12/21/2021     Right Left Right Left Right Left   Ankle Circumference 25cm 24cm 25cm  24cm    Figure 8 49cm 49cm  49cm  49cm      AROM/PROM         Joint: Date:9/24/2021  Date: 11/2/2021    Date: 12/21/2021    Active LE ROM Right (Degrees) Left (Degrees) Right (Degrees) Left (Degrees) Right (Degrees) Left (Degrees)   Hip Flexion         Hip Extension         Hip IR         Hip ER         Knee Extension         Knee Flexion         Ankle DF -3 0 -1 °  +5 degrees    Ankle PF 64 58 65 °  65 degrees    Ankle IV 35 40 40 °  40 degrees    Ankle EV 18 35 24 °  25 degrees      STRENGTH         Joint: Date: 9/24/2021  Date: 11/2/2021  Date: 12/21/2021     Right Left Right Left Right Left   Hip Abduction         Hip Adduction         Hip IR         Hip ER         Hip Flexion 5/5 5/5 5/5  5/5    Knee Extension 5/5 5/5 5/5  5/5    Knee Flexion 5/5 5/5 5/5  5/5    Ankle DF 5/5 5/5  Pain base of anterior and lateral MTP 5/5  Minimal discomfort  5/5  Pain base of anterior and lateral MTP    Ankle PF 5/5 5/5 5/5  5/5    Ankle IV 5/5 5/5 5/5  5/5    Ankle EV 5/5 5/5 5/5  5/5      PASSIVE ACCESSORY MOTION: 12/21/2021   -Talocrural mobility: Improved mobility on Rt side   -Subtalar mobility: WFL   - Midtarsal mobility: WFL   -Tarsometatarsal mobility: WFL   -metatarsophalangeal(MTP) mobility: improved mobility on Rt first metatarsal      PALPATION Date: 12/21/2021   TTP -Lateral aspect of Rt calcaneous (improved)   TONE WFL      NEUROLOGICAL SCREEN: Assessed @ Initial Visit    -RADIATING SYMPTOMS: NO     -DERMATOMES:Normal and equal B      FUNCTIONAL MOBILITY:  Assessed @ Initial Visit:    -Affecting participation in basic ADLs and functional tasks.   -Improved tolerance of walking and standing   -Ambulation/Gait: Patient still ambulates on toes versus having a heel strike but when corrected she is able to without pain, just minor discomfort.   -Bed mobility: WFL   -Stairs: improved mobility   -Transfers: WFL   -Wheelchair: N/A     Body Structures Involved:  1. Bones  2. Joints  3. Muscles  4. Ligaments Body Functions Affected:  1. Sensory/Pain  2. Neuromusculoskeletal  3. Movement Related Activities and Participation Affected:  1. Mobility  2. Self Care  3. Domestic Life  4. Interpersonal Interactions and Relationships  5. Community, Social and Civic Life   CLINICAL DECISION MAKING:   TOOL USED:   -FOOT AND ANKLE ABILITY MEASURE (FAAM)  Score:  Initial: 52/84 58/84 (Date: 11/2/21) Most Recent: 80/84 (Date: 12/21/2021)   Interpretation of Score: For the \"Activities of Daily Living\", there are 21 questions each scored on a 5 point scale with 0 representing \"Unable to do\" and 4 representing \"No difficulty\". The lower the score, the greater the functional disability. 84/84 represents no disability. Minimal detectable change is 5.7 points. With the addition of the 8 questions in the \"Sports Subscale,\" there are 29 questions, each scored on a 5 point scale with 0 representing \"Unable to do\" and 4 representing \"No difficulty\". The lower the score, the greater the functional disability. 116/116 represents no disability. Minimal detectable change is 12.3 points. MEDICAL NECESSITY:  · Skilled intervention continues to be required due to above deficits affecting participation in basic ADLs and overall functional tolerance. REASON FOR SERVICES/ OTHER COMMENTS:  · Patient continues to require skilled intervention due to  above deficits affecting participation in basic ADLs and overall functional tolerance.           TOTAL TREATMENT DURATION:  PT Patient Time In/Time Out  Time In: 1530  Time Out: 13 [facility] Shelia, PT, DPT

## 2021-12-21 NOTE — PROGRESS NOTES
Dorian Rose  : 1950  [place]  [place] [address] at [facility], [address]  [phone]   [phone]      OUTPATIENT PHYSICAL THERAPY: Daily Treatment Note 2021  Visit Count:  17          TREATMENT PLAN:  Effective Dates: 2021 TO 2021 (90 days). Frequency/Duration: 2 times a week for 90 Days    ICD-10: Treatment Diagnosis:   Difficulty in walking, not elsewhere classified (R26.2)  Pain in right ankle and joints of right foot (M25.571)  Stiffness of right ankle, not elsewhere classified (M25.671) Future Appointments   Date Time Provider Brad Zina   2021  3:30 PM Esperanza Iglesias PT, DPT [facility]          PRE-TREATMENT SYMPTOMS/COMPLAINTS:  Patient notes she had xray which revealed minimal calcification of a bone spur on calcaneus. States she is feeling better overall and able to ambulate with her sneakers and oofa's without pain in her heel. Will be going to foot MD for further evaluation. MEDICATIONS REVIEWED:  2021   TREATMENT:   (In addition to Assessment/Re-Assessment sessions the following treatments were rendered)    THERAPEUTIC EXERCISE: (53 minutes):  Exercises per grid below to improve mobility, strength and balance. Required minimal visual and verbal cues to promote proper body alignment and promote proper body posture. Progressed resistance, range and complexity of movement as indicated.      Date:  2021   Activity/Exercise     DC measurements   Scifit bike 8min  Level 4.5  upright   Gastroc stretch 2x1min   Heel raises 3-way 2x10 reps  On board for increased ROM   Toe walking 2x50ft   Monster walks with resistance around foot    Lateral walking squats with elbow extension holding weighted ball    Tandem balance 3x30s B  Arms crossed at chest   Marching walking 2x50ft   Resisted ankle PF    Tipping bird 2x10 reps B   Hip flexion, ER, IR walking 2x50ft   Wobble board DF/PF      HEP:  [employer]  Access Code: [email or web address]  URL: [email or web address]  Date: 12/21/2021  Prepared by: Lanie Ryan    Exercises  Standing Heel Raise with Toes Turned Out - 2 x daily - 7 x weekly - 2 sets - 10 reps  Standing Heel Raise with Toes Turned In - 2 x daily - 7 x weekly - 2 sets - 10 reps  Standing Heel Raise - 2 x daily - 7 x weekly - 2 sets - 10 reps  Gastroc Stretch on Wall - 2 x daily - 7 x weekly - 3 sets - 30s hold  Tandem Stance with Arms Crossed - 2 x daily - 7 x weekly - 3 sets - 30s hold  Forward T with Counter Support - 2 x daily - 7 x weekly - 2 sets - 10 reps  Walking March - 2 x daily - 7 x weekly - 2 sets - 10 reps  Toe Walking - 2 x daily - 7 x weekly - 2 sets - 10 reps  Eccentric Heel Lowering on Step - 2 x daily - 7 x weekly - 2 sets - 10 reps      MANUAL THERAPY: (0 minutes): Joint mobilization, Soft tissue mobilization and Manipulation was utilized and necessary because of the patient's restricted joint motion, painful spasm, loss of articular motion and restricted motion of soft tissue. Patient prone:  -STM with hands and guasha on Rt calf and achilles   -DN to lateral aspect of Rt fat pad and mm belly   -Ice massage to Rt lateral heel      Patient supine: -NOT TODAY 12/21/2021  -Talocrural mobilization to increase DF with dysem  -Subtalar mobilization to increase IV/EV with dysem  -fat pad mobility with dysem     (Used abbreviations: MET - muscle energy technique; PNF - proprioceptive neuromuscular facilitation; NMR - neuromuscular re-education; AP - anterior to posterior; PA - posterior to anterior)    DRY NEEDLING: (0 min concurrent with manual therapy): Consent signed, scanned, and filed. Location: 3 needles lateral aspect of Rt heel, 1 needle plantar surface of lateral aspect of heel  Needles used: .30x. 30  Purpose: Reduce muscle tone and improve functional mobility.  Stimulate descending pain inhibitory systems or cortical areas of the brain that are involved in pain control. MODALITIES: (0 minutes):      None today       TREATMENT/SESSION ASSESSMENT:  Karla Laguna with no complaints of heel pain during exercises. Reported slight pulling in plantar arch during balance exercises. Pt provided with HEP of updated exercises. RECOMMENDATIONS/INTENT FOR NEXT TREATMENT SESSION: \"Next visit will focus on advancements to more challenging activities\".     PAIN: Initial: 1/10 Post Session:  1/10     Total Treatment Billable Duration: 53 minutes  PT Patient Time In/Time Out  Time In: 6580  Time Out: 190 [facility] Lefty Tee DPT 36.9

## 2022-03-19 PROBLEM — L71.9 ROSACEA: Status: ACTIVE | Noted: 2018-10-24

## 2022-03-19 PROBLEM — R73.9 HYPERGLYCEMIA: Status: ACTIVE | Noted: 2018-10-24

## 2022-03-19 PROBLEM — E78.5 HYPERLIPIDEMIA: Status: ACTIVE | Noted: 2018-10-24

## 2022-07-26 ENCOUNTER — HOSPITAL ENCOUNTER (OUTPATIENT)
Dept: MAMMOGRAPHY | Age: 72
Discharge: HOME | End: 2022-07-29
Payer: MEDICARE

## 2022-07-26 DIAGNOSIS — Z12.31 ENCOUNTER FOR SCREENING MAMMOGRAM FOR MALIGNANT NEOPLASM OF BREAST: ICD-10-CM

## 2022-07-26 PROCEDURE — 77067 SCR MAMMO BI INCL CAD: CPT

## 2023-06-27 ENCOUNTER — TRANSCRIBE ORDERS (OUTPATIENT)
Dept: SCHEDULING | Age: 73
End: 2023-06-27

## 2023-06-27 DIAGNOSIS — Z12.31 SCREENING MAMMOGRAM FOR HIGH-RISK PATIENT: Primary | ICD-10-CM

## 2023-07-27 ENCOUNTER — HOSPITAL ENCOUNTER (OUTPATIENT)
Dept: MAMMOGRAPHY | Age: 73
Discharge: HOME | End: 2023-07-27
Payer: MEDICARE

## 2023-07-27 VITALS — BODY MASS INDEX: 28.89 KG/M2 | HEIGHT: 62 IN | WEIGHT: 157 LBS

## 2023-07-27 DIAGNOSIS — Z12.31 SCREENING MAMMOGRAM FOR HIGH-RISK PATIENT: ICD-10-CM

## 2023-07-27 PROCEDURE — 77067 SCR MAMMO BI INCL CAD: CPT

## 2024-07-05 ENCOUNTER — TRANSCRIBE ORDERS (OUTPATIENT)
Dept: SCHEDULING | Age: 74
End: 2024-07-05

## 2024-07-05 DIAGNOSIS — Z12.31 SCREENING MAMMOGRAM FOR HIGH-RISK PATIENT: Primary | ICD-10-CM

## 2024-08-12 ENCOUNTER — HOSPITAL ENCOUNTER (OUTPATIENT)
Dept: MAMMOGRAPHY | Age: 74
Discharge: HOME | End: 2024-08-15
Payer: MEDICARE

## 2024-08-12 DIAGNOSIS — Z12.31 SCREENING MAMMOGRAM FOR HIGH-RISK PATIENT: ICD-10-CM

## 2024-08-12 DIAGNOSIS — Z78.0 MENOPAUSE: ICD-10-CM

## 2024-08-12 DIAGNOSIS — Z13.820 SPECIAL SCREENING FOR OSTEOPOROSIS: ICD-10-CM

## 2024-08-12 PROCEDURE — 77080 DXA BONE DENSITY AXIAL: CPT

## 2024-08-12 PROCEDURE — 77067 SCR MAMMO BI INCL CAD: CPT

## 2025-08-14 ENCOUNTER — HOSPITAL ENCOUNTER (OUTPATIENT)
Dept: MAMMOGRAPHY | Age: 75
Discharge: HOME | End: 2025-08-17
Payer: MEDICARE

## 2025-08-14 VITALS — WEIGHT: 150 LBS | BODY MASS INDEX: 26.58 KG/M2 | HEIGHT: 63 IN

## 2025-08-14 DIAGNOSIS — Z12.31 ENCOUNTER FOR SCREENING MAMMOGRAM FOR HIGH-RISK PATIENT: ICD-10-CM

## 2025-08-14 PROCEDURE — 77063 BREAST TOMOSYNTHESIS BI: CPT
